# Patient Record
Sex: FEMALE | Race: WHITE | NOT HISPANIC OR LATINO | Employment: OTHER | URBAN - METROPOLITAN AREA
[De-identification: names, ages, dates, MRNs, and addresses within clinical notes are randomized per-mention and may not be internally consistent; named-entity substitution may affect disease eponyms.]

---

## 2018-08-29 LAB
EXTERNAL HIV SCREEN: NORMAL
HBA1C MFR BLD HPLC: 5.5 %
HCV AB SER-ACNC: <0.1

## 2021-02-15 ENCOUNTER — OFFICE VISIT (OUTPATIENT)
Dept: INTERNAL MEDICINE CLINIC | Facility: CLINIC | Age: 66
End: 2021-02-15
Payer: COMMERCIAL

## 2021-02-15 VITALS
HEART RATE: 70 BPM | SYSTOLIC BLOOD PRESSURE: 104 MMHG | TEMPERATURE: 97.9 F | HEIGHT: 63 IN | WEIGHT: 184 LBS | DIASTOLIC BLOOD PRESSURE: 78 MMHG | BODY MASS INDEX: 32.6 KG/M2

## 2021-02-15 DIAGNOSIS — R05.9 COUGH: ICD-10-CM

## 2021-02-15 DIAGNOSIS — I10 ESSENTIAL HYPERTENSION: Primary | ICD-10-CM

## 2021-02-15 DIAGNOSIS — K21.9 GASTROESOPHAGEAL REFLUX DISEASE WITHOUT ESOPHAGITIS: ICD-10-CM

## 2021-02-15 DIAGNOSIS — Z98.84 S/P GASTRIC BYPASS: ICD-10-CM

## 2021-02-15 DIAGNOSIS — D50.8 IRON DEFICIENCY ANEMIA SECONDARY TO INADEQUATE DIETARY IRON INTAKE: ICD-10-CM

## 2021-02-15 DIAGNOSIS — I82.561 CHRONIC DEEP VEIN THROMBOSIS (DVT) OF CALF MUSCLE VEIN OF RIGHT LOWER EXTREMITY (HCC): ICD-10-CM

## 2021-02-15 DIAGNOSIS — I26.99 PE (PULMONARY THROMBOEMBOLISM) (HCC): ICD-10-CM

## 2021-02-15 DIAGNOSIS — R06.02 SOB (SHORTNESS OF BREATH): ICD-10-CM

## 2021-02-15 PROCEDURE — 99204 OFFICE O/P NEW MOD 45 MIN: CPT | Performed by: FAMILY MEDICINE

## 2021-02-15 PROCEDURE — 3725F SCREEN DEPRESSION PERFORMED: CPT | Performed by: FAMILY MEDICINE

## 2021-02-15 RX ORDER — QUETIAPINE FUMARATE 50 MG/1
50 TABLET, FILM COATED ORAL
COMMUNITY
End: 2021-10-25 | Stop reason: ALTCHOICE

## 2021-02-15 RX ORDER — FENTANYL 25 UG/H
1 PATCH TRANSDERMAL
COMMUNITY
End: 2021-10-25 | Stop reason: ALTCHOICE

## 2021-02-15 RX ORDER — RIVAROXABAN 20 MG/1
TABLET, FILM COATED ORAL
COMMUNITY
Start: 2021-01-29 | End: 2021-04-19 | Stop reason: SDUPTHER

## 2021-02-15 RX ORDER — OXYCODONE HCL 20 MG/1
20 TABLET, FILM COATED, EXTENDED RELEASE ORAL EVERY 12 HOURS SCHEDULED
COMMUNITY
End: 2021-05-28 | Stop reason: SDUPTHER

## 2021-02-15 RX ORDER — BUPROPION HYDROCHLORIDE 300 MG/1
TABLET ORAL
COMMUNITY
Start: 2021-01-30

## 2021-02-15 RX ORDER — FAMOTIDINE 20 MG/1
20 TABLET, FILM COATED ORAL
COMMUNITY
End: 2021-06-08 | Stop reason: SDUPTHER

## 2021-02-15 RX ORDER — CELECOXIB 50 MG/1
50 CAPSULE ORAL 2 TIMES DAILY
COMMUNITY
End: 2021-03-08

## 2021-02-15 RX ORDER — METOPROLOL SUCCINATE 25 MG/1
TABLET, EXTENDED RELEASE ORAL
COMMUNITY
Start: 2021-01-29 | End: 2021-04-19 | Stop reason: SDUPTHER

## 2021-02-15 RX ORDER — FLUOXETINE HYDROCHLORIDE 40 MG/1
CAPSULE ORAL
COMMUNITY
Start: 2021-01-27 | End: 2021-02-15

## 2021-02-15 RX ORDER — GABAPENTIN 600 MG/1
1200 TABLET ORAL 3 TIMES DAILY
COMMUNITY
End: 2021-05-17 | Stop reason: SDUPTHER

## 2021-02-15 RX ORDER — CYCLOBENZAPRINE HCL 10 MG
TABLET ORAL
COMMUNITY
Start: 2021-02-02 | End: 2021-06-08 | Stop reason: SDUPTHER

## 2021-02-15 RX ORDER — ZOLPIDEM TARTRATE 12.5 MG/1
TABLET, FILM COATED, EXTENDED RELEASE ORAL
COMMUNITY
Start: 2021-02-01 | End: 2021-06-28 | Stop reason: SDUPTHER

## 2021-02-15 RX ORDER — GABAPENTIN 600 MG/1
TABLET ORAL
COMMUNITY
Start: 2021-01-05 | End: 2021-02-15

## 2021-02-15 RX ORDER — FLUOXETINE HYDROCHLORIDE 40 MG/1
40 CAPSULE ORAL
COMMUNITY

## 2021-02-15 NOTE — PROGRESS NOTES
INITIAL OFFICE VISIT  [facility] - [facility] LISANDRA SCHULTZ    NAME: Tiana Simpson  AGE: 63 y o  SEX: female  : 1955     DATE: 2021     Assessment and Plan:     Problem List Items Addressed This Visit     None      Visit Diagnoses     Essential hypertension    -  Primary    Relevant Medications    metoprolol succinate (TOPROL-XL) 25 mg 24 hr tablet    Other Relevant Orders    Comprehensive metabolic panel    PE (pulmonary thromboembolism) (HCC)        Chronic deep vein thrombosis (DVT) of calf muscle vein of right lower extremity (HCC)        Gastroesophageal reflux disease without esophagitis        Relevant Medications    famotidine (PEPCID) 20 mg tablet    Iron deficiency anemia secondary to inadequate dietary iron intake        Relevant Orders    CBC and Platelet    Ferritin    S/P gastric bypass        Relevant Orders    Vitamin D 25 hydroxy    Vitamin B12    CBC and Platelet    SOB (shortness of breath)        Relevant Orders    XR chest pa & lateral    Cough        Relevant Orders    XR chest pa & lateral          Plan; CXR for cough and SOB  Pt afebrile and with duration of symptom low suspicion for PNA  Pt to continue otc supportive therapies for now  Will check for likely deficiencies and correct if indicated  BP at goal  Pt to continue all current therapies as prescribed  Return in about 3 weeks (around 3/8/2021) for Next scheduled follow up  Chief Complaint:     Chief Complaint   Patient presents with    Establish [facility]     bronchitis - wants to check       History of Present Illness:   82-year-old female presents to establish care  Reports history of  DVT and PE secondary to a clotting disorder, GERD, iron deficiency anemia, high blood pressure, status post bariatric surgery almost 20 years ago  She does take a daily anticoagulant  Today she reports that she has had episode of bronchitis for the past 2 months  Symptoms come and go    She has been using over-the-counter Mucinex and Tylenol:  Flu for her symptoms  Has a persistent cough  Some shortness of breath when taking stairs  Initially had chest tightness at the beginning of the course but that has since improved  Her cough is productive but not a large amount  Occasionally tinged with blood from coughing hard  Not a current everyday smoker  Hx of second hand smoke exposure   From father and from spouse  Up-to-date on a colonoscopy  Up-to-date on her Pap  Up-to-date on her mammogram   Denies hx of MI or cva  S/P gastric bypass  Has anemia  S/p iron infusion last year  The following portions of the patient's history were reviewed and updated as appropriate: allergies, current medications, past family history, past medical history, past social history, past surgical history and problem list      Review of Systems:     Review of Systems   Constitutional: Positive for fatigue  HENT: Negative for hearing loss  Eyes: Positive for visual disturbance (needs glasses)  Respiratory: Positive for cough and shortness of breath  Negative for chest tightness and wheezing  Cardiovascular: Negative for chest pain and palpitations  Gastrointestinal: Negative for blood in stool, constipation and diarrhea  Genitourinary: Negative for hematuria  Musculoskeletal: Positive for back pain  Negative for gait problem  Skin: Positive for rash  Neurological: Negative for dizziness, light-headedness and headaches  Past Medical History:     Past Medical History:   Diagnosis Date    Anxiety     Depression     Hypertension         Past Surgical History:   History reviewed  No pertinent surgical history       Social History:     Social History     Socioeconomic History    Marital status: /Civil Union     Spouse name: None    Number of children: None    Years of education: None    Highest education level: None   Occupational History    None   Social Needs    Financial resource strain: None    Food insecurity     Worry: None     Inability: None    Transportation needs     Medical: None     Non-medical: None   Tobacco Use    Smoking status: Never Smoker    Smokeless tobacco: Never Used   Substance and Sexual Activity    Alcohol use: None    Drug use: None    Sexual activity: None   Lifestyle    Physical activity     Days per week: None     Minutes per session: None    Stress: None   Relationships    Social connections     Talks on phone: None     Gets together: None     Attends Holiness service: None     Active member of club or organization: None     Attends meetings of clubs or organizations: None     Relationship status: None    Intimate partner violence     Fear of current or ex partner: None     Emotionally abused: None     Physically abused: None     Forced sexual activity: None   Other Topics Concern    None   Social History Narrative    None         Family History:   History reviewed  No pertinent family history       Current Medications:     Current Outpatient Medications:     buPROPion (WELLBUTRIN XL) 300 mg 24 hr tablet, , Disp: , Rfl:     Calcium Carbonate-Vitamin D 600-400 MG-UNIT per tablet, Take 2 tablets by mouth, Disp: , Rfl:     celecoxib (CeleBREX) 50 MG capsule, Take 50 mg by mouth 2 (two) times a day, Disp: , Rfl:     cyclobenzaprine (FLEXERIL) 10 mg tablet, , Disp: , Rfl:     famotidine (PEPCID) 20 mg tablet, Take 20 mg by mouth, Disp: , Rfl:     fentaNYL (DURAGESIC) 25 mcg/hr, Place 1 patch on the skin every third day, Disp: , Rfl:     FLUoxetine (PROzac) 40 MG capsule, Take 40 mg by mouth, Disp: , Rfl:     gabapentin (NEURONTIN) 600 MG tablet, Take 1,200 mg by mouth 3 (three) times a day, Disp: , Rfl:     metoprolol succinate (TOPROL-XL) 25 mg 24 hr tablet, , Disp: , Rfl:     MULTIPLE VITAMIN PO, Take by mouth, Disp: , Rfl:     oxyCODONE (OxyCONTIN) 20 mg 12 hr tablet, Take 20 mg by mouth every 12 (twelve) hours, Disp: , Rfl:     QUEtiapine (SEROquel) 50 mg tablet, Take 50 mg by mouth, Disp: , Rfl:     Xarelto 20 MG tablet, , Disp: , Rfl:     zolpidem (AMBIEN CR) 12 5 MG CR tablet, , Disp: , Rfl:      Allergies: Allergies   Allergen Reactions    Molds & Smuts Hives     Difficulty breathing    Other Hives     Cherry, walnuts, almonds, all nuts, fresh tree fruits, pet dander    Peach Flavor Hives     Fresh peaches only    Pollen Extract Hives        Physical Exam:     /78 (BP Location: Left arm, Patient Position: Sitting) Comment: ds  Pulse 70   Temp 97 9 °F (36 6 °C) (Temporal)   Ht 5' 3" (1 6 m)   Wt 83 5 kg (184 lb)   BMI 32 59 kg/m²     Physical Exam  Constitutional:       Appearance: She is not ill-appearing  HENT:      Head: Normocephalic and atraumatic  Right Ear: External ear normal       Left Ear: External ear normal    Eyes:      Conjunctiva/sclera: Conjunctivae normal       Pupils: Pupils are equal, round, and reactive to light  Cardiovascular:      Rate and Rhythm: Normal rate and regular rhythm  Heart sounds: No murmur  Pulmonary:      Effort: Pulmonary effort is normal       Breath sounds: Normal breath sounds  No wheezing, rhonchi or rales  Abdominal:      General: Bowel sounds are normal       Palpations: Abdomen is soft  Tenderness: There is no abdominal tenderness  Musculoskeletal:         General: No tenderness  Right lower leg: No edema  Left lower leg: No edema  Neurological:      Mental Status: She is alert  Gait: Gait normal       Deep Tendon Reflexes: Reflexes normal    Psychiatric:         Mood and Affect: Mood normal          Behavior: Behavior normal            Data:     Laboratory Results: I have personally reviewed the pertinent laboratory results/reports   Radiology/Other Diagnostic Testing Results: I have personally reviewed pertinent reports        Jerald Ro DO  [facility]

## 2021-02-17 ENCOUNTER — LAB (OUTPATIENT)
Dept: LAB | Facility: HOSPITAL | Age: 66
End: 2021-02-17
Payer: COMMERCIAL

## 2021-02-17 ENCOUNTER — HOSPITAL ENCOUNTER (OUTPATIENT)
Dept: RADIOLOGY | Facility: HOSPITAL | Age: 66
Discharge: HOME | End: 2021-02-17
Payer: COMMERCIAL

## 2021-02-17 DIAGNOSIS — D50.8 IRON DEFICIENCY ANEMIA SECONDARY TO INADEQUATE DIETARY IRON INTAKE: ICD-10-CM

## 2021-02-17 DIAGNOSIS — R05.9 COUGH: ICD-10-CM

## 2021-02-17 DIAGNOSIS — I10 ESSENTIAL HYPERTENSION: ICD-10-CM

## 2021-02-17 DIAGNOSIS — R06.02 SOB (SHORTNESS OF BREATH): ICD-10-CM

## 2021-02-17 DIAGNOSIS — Z98.84 S/P GASTRIC BYPASS: ICD-10-CM

## 2021-02-17 LAB
25(OH)D3 SERPL-MCNC: 24.2 NG/ML (ref 30–100)
ALBUMIN SERPL BCP-MCNC: 3.3 G/DL (ref 3.5–5)
ALP SERPL-CCNC: 84 U/L (ref 46–116)
ALT SERPL W P-5'-P-CCNC: 20 U/L (ref 12–78)
ANION GAP SERPL CALCULATED.3IONS-SCNC: 7 MMOL/L (ref 4–13)
AST SERPL W P-5'-P-CCNC: 25 U/L (ref 5–45)
BILIRUB SERPL-MCNC: 0.6 MG/DL (ref 0.2–1)
BUN SERPL-MCNC: 12 MG/DL (ref 5–25)
CALCIUM ALBUM COR SERPL-MCNC: 9.5 MG/DL (ref 8.3–10.1)
CALCIUM SERPL-MCNC: 8.9 MG/DL (ref 8.3–10.1)
CHLORIDE SERPL-SCNC: 106 MMOL/L (ref 100–108)
CO2 SERPL-SCNC: 28 MMOL/L (ref 21–32)
CREAT SERPL-MCNC: 0.86 MG/DL (ref 0.6–1.3)
ERYTHROCYTE [DISTWIDTH] IN BLOOD BY AUTOMATED COUNT: 13.7 % (ref 11.6–15.1)
FERRITIN SERPL-MCNC: 122 NG/ML (ref 8–388)
GFR SERPL CREATININE-BSD FRML MDRD: 72 ML/MIN/1.73SQ M
GLUCOSE P FAST SERPL-MCNC: 87 MG/DL (ref 65–99)
HCT VFR BLD AUTO: 39.3 % (ref 34.8–46.1)
HGB BLD-MCNC: 12.7 G/DL (ref 11.5–15.4)
MCH RBC QN AUTO: 30.3 PG (ref 26.8–34.3)
MCHC RBC AUTO-ENTMCNC: 32.3 G/DL (ref 31.4–37.4)
MCV RBC AUTO: 94 FL (ref 82–98)
PLATELET # BLD AUTO: 313 THOUSANDS/UL (ref 149–390)
PMV BLD AUTO: 8.9 FL (ref 8.9–12.7)
POTASSIUM SERPL-SCNC: 3.9 MMOL/L (ref 3.5–5.3)
PROT SERPL-MCNC: 7.2 G/DL (ref 6.4–8.2)
RBC # BLD AUTO: 4.19 MILLION/UL (ref 3.81–5.12)
SODIUM SERPL-SCNC: 141 MMOL/L (ref 136–145)
VIT B12 SERPL-MCNC: 418 PG/ML (ref 100–900)
WBC # BLD AUTO: 4.28 THOUSAND/UL (ref 4.31–10.16)

## 2021-02-17 PROCEDURE — 36415 COLL VENOUS BLD VENIPUNCTURE: CPT

## 2021-02-17 PROCEDURE — 82306 VITAMIN D 25 HYDROXY: CPT

## 2021-02-17 PROCEDURE — 80053 COMPREHEN METABOLIC PANEL: CPT

## 2021-02-17 PROCEDURE — 71046 X-RAY EXAM CHEST 2 VIEWS: CPT

## 2021-02-17 PROCEDURE — 85027 COMPLETE CBC AUTOMATED: CPT

## 2021-02-17 PROCEDURE — 82607 VITAMIN B-12: CPT

## 2021-02-17 PROCEDURE — 82728 ASSAY OF FERRITIN: CPT

## 2021-02-19 ENCOUNTER — TELEPHONE (OUTPATIENT)
Dept: INTERNAL MEDICINE CLINIC | Facility: CLINIC | Age: 66
End: 2021-02-19

## 2021-02-19 NOTE — TELEPHONE ENCOUNTER
----- Message from Landy Rajput DO sent at 2/19/2021 12:59 PM EST -----  Regarding: labs  Overall labs were ok and there was nothing warranting  immediate follow up labs    Iron and vitamin B12 levels were normal  Her Vit D level were mildly lower than the normal range  She is to take a minimum of 5000 units of vitamin D a day       If she can't find this OTC I can prescribe them     Thanks,     Dr Lupe Omer

## 2021-02-23 DIAGNOSIS — J98.4 PULMONARY SCARRING: ICD-10-CM

## 2021-02-23 DIAGNOSIS — R06.02 SOB (SHORTNESS OF BREATH): Primary | ICD-10-CM

## 2021-03-08 ENCOUNTER — OFFICE VISIT (OUTPATIENT)
Dept: INTERNAL MEDICINE CLINIC | Facility: CLINIC | Age: 66
End: 2021-03-08
Payer: COMMERCIAL

## 2021-03-08 VITALS
HEART RATE: 68 BPM | DIASTOLIC BLOOD PRESSURE: 78 MMHG | WEIGHT: 187.2 LBS | SYSTOLIC BLOOD PRESSURE: 102 MMHG | HEIGHT: 63 IN | TEMPERATURE: 97.9 F | BODY MASS INDEX: 33.17 KG/M2

## 2021-03-08 DIAGNOSIS — M54.16 LUMBAR NERVE ROOT ENTRAPMENT: ICD-10-CM

## 2021-03-08 DIAGNOSIS — I10 ESSENTIAL HYPERTENSION: ICD-10-CM

## 2021-03-08 DIAGNOSIS — Z79.01 CHRONIC ANTICOAGULATION: ICD-10-CM

## 2021-03-08 DIAGNOSIS — E55.9 VITAMIN D INSUFFICIENCY: Primary | ICD-10-CM

## 2021-03-08 DIAGNOSIS — R06.02 SOB (SHORTNESS OF BREATH): ICD-10-CM

## 2021-03-08 PROBLEM — Z98.84 S/P GASTRIC BYPASS: Status: ACTIVE | Noted: 2021-03-08

## 2021-03-08 PROBLEM — K21.9 GASTROESOPHAGEAL REFLUX DISEASE WITHOUT ESOPHAGITIS: Status: ACTIVE | Noted: 2021-03-08

## 2021-03-08 PROCEDURE — 99214 OFFICE O/P EST MOD 30 MIN: CPT | Performed by: FAMILY MEDICINE

## 2021-03-08 RX ORDER — ARIPIPRAZOLE 5 MG/1
TABLET ORAL
COMMUNITY
Start: 2021-02-22
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
## 2021-03-08 NOTE — PATIENT INSTRUCTIONS
Continue your breathing exercises   I will call you with the results of your breathing function testing

## 2021-03-10 ENCOUNTER — HOSPITAL ENCOUNTER (OUTPATIENT)
Dept: PULMONOLOGY | Facility: HOSPITAL | Age: 66
Discharge: HOME | End: 2021-03-10
Payer: COMMERCIAL

## 2021-03-10 ENCOUNTER — HOSPITAL ENCOUNTER (EMERGENCY)
Facility: HOSPITAL | Age: 66
Discharge: HOME | End: 2021-03-10
Attending: EMERGENCY MEDICINE | Admitting: EMERGENCY MEDICINE
Payer: COMMERCIAL

## 2021-03-10 ENCOUNTER — APPOINTMENT (EMERGENCY)
Dept: RADIOLOGY | Facility: HOSPITAL | Age: 66
End: 2021-03-10
Payer: COMMERCIAL

## 2021-03-10 VITALS
DIASTOLIC BLOOD PRESSURE: 58 MMHG | TEMPERATURE: 98.3 F | OXYGEN SATURATION: 93 % | HEART RATE: 70 BPM | RESPIRATION RATE: 18 BRPM | SYSTOLIC BLOOD PRESSURE: 116 MMHG

## 2021-03-10 DIAGNOSIS — J98.4 PULMONARY SCARRING: ICD-10-CM

## 2021-03-10 DIAGNOSIS — S52.502A CLOSED FRACTURE OF DISTAL ENDS OF LEFT RADIUS AND ULNA, INITIAL ENCOUNTER: Primary | ICD-10-CM

## 2021-03-10 DIAGNOSIS — S52.602A CLOSED FRACTURE OF DISTAL ENDS OF LEFT RADIUS AND ULNA, INITIAL ENCOUNTER: Primary | ICD-10-CM

## 2021-03-10 DIAGNOSIS — R06.02 SOB (SHORTNESS OF BREATH): ICD-10-CM

## 2021-03-10 PROCEDURE — 29125 APPL SHORT ARM SPLINT STATIC: CPT | Performed by: PHYSICIAN ASSISTANT

## 2021-03-10 PROCEDURE — 94760 N-INVAS EAR/PLS OXIMETRY 1: CPT

## 2021-03-10 PROCEDURE — 94729 DIFFUSING CAPACITY: CPT

## 2021-03-10 PROCEDURE — 94727 GAS DIL/WSHOT DETER LNG VOL: CPT | Performed by: INTERNAL MEDICINE

## 2021-03-10 PROCEDURE — 94010 BREATHING CAPACITY TEST: CPT

## 2021-03-10 PROCEDURE — 99283 EMERGENCY DEPT VISIT LOW MDM: CPT

## 2021-03-10 PROCEDURE — 94729 DIFFUSING CAPACITY: CPT | Performed by: INTERNAL MEDICINE

## 2021-03-10 PROCEDURE — 99284 EMERGENCY DEPT VISIT MOD MDM: CPT | Performed by: PHYSICIAN ASSISTANT

## 2021-03-10 PROCEDURE — 94727 GAS DIL/WSHOT DETER LNG VOL: CPT

## 2021-03-10 PROCEDURE — 94010 BREATHING CAPACITY TEST: CPT | Performed by: INTERNAL MEDICINE

## 2021-03-10 PROCEDURE — 73110 X-RAY EXAM OF WRIST: CPT

## 2021-03-10 NOTE — ED PROVIDER NOTES
History  Chief Complaint   Patient presents with    Wrist Injury     Patient reports she fell on her bathroom this morning and injured her L wrist  Patient reports negative head strike, negative LOC, postive BT  Patient is a 47-year-old female presents emergency department complaints of left wrist pain  Patient states she got up to use the restroom in the middle night and tripped on the carpet  Patient states she fell on her left wrist   She denies any head injury, neck pain, back pain, chest pain, shortness of breath  Prior to Admission Medications   Prescriptions Last Dose Informant Patient Reported? Taking? ARIPiprazole (ABILIFY) 5 mg tablet   Yes No   Calcium Carbonate-Vitamin D 600-400 MG-UNIT per tablet   Yes No   Sig: Take 2 tablets by mouth   FLUoxetine (PROzac) 40 MG capsule   Yes No   Sig: Take 40 mg by mouth   MULTIPLE VITAMIN PO   Yes No   Sig: Take by mouth   QUEtiapine (SEROquel) 50 mg tablet   Yes No   Sig: Take 50 mg by mouth   Xarelto 20 MG tablet   Yes No   buPROPion (WELLBUTRIN XL) 300 mg 24 hr tablet   Yes No   cyclobenzaprine (FLEXERIL) 10 mg tablet   Yes No   famotidine (PEPCID) 20 mg tablet   Yes No   Sig: Take 20 mg by mouth   fentaNYL (DURAGESIC) 25 mcg/hr   Yes No   Sig: Place 1 patch on the skin every third day   gabapentin (NEURONTIN) 600 MG tablet   Yes No   Sig: Take 1,200 mg by mouth 3 (three) times a day   metoprolol succinate (TOPROL-XL) 25 mg 24 hr tablet   Yes No   oxyCODONE (OxyCONTIN) 20 mg 12 hr tablet   Yes No   Sig: Take 20 mg by mouth every 12 (twelve) hours   zolpidem (AMBIEN CR) 12 5 MG CR tablet   Yes No      Facility-Administered Medications: None       Past Medical History:   Diagnosis Date    Anxiety     Depression     Hypertension        History reviewed  No pertinent surgical history  History reviewed  No pertinent family history  I have reviewed and agree with the history as documented      E-Cigarette/Vaping    E-Cigarette Use Never User E-Cigarette/Vaping Substances    Nicotine No     THC No     CBD No     Flavoring No     Other No     Unknown No      Social History     Tobacco Use    Smoking status: Never Smoker    Smokeless tobacco: Never Used   Substance Use Topics    Alcohol use: Not on file    Drug use: Not on file       Review of Systems   Constitutional: Negative for fever  Respiratory: Negative for shortness of breath  Cardiovascular: Negative for chest pain  Musculoskeletal: Positive for arthralgias  All other systems reviewed and are negative  Physical Exam  Physical Exam  Vitals signs reviewed  Constitutional:       Appearance: Normal appearance  HENT:      Head: Normocephalic and atraumatic  Cardiovascular:      Rate and Rhythm: Normal rate and regular rhythm  Pulses: Normal pulses  Heart sounds: Normal heart sounds  Musculoskeletal:      Left wrist: She exhibits decreased range of motion, tenderness and swelling  Skin:     General: Skin is warm  Capillary Refill: Capillary refill takes less than 2 seconds  Neurological:      General: No focal deficit present  Mental Status: She is alert and oriented to person, place, and time  Psychiatric:         Mood and Affect: Mood normal          Behavior: Behavior normal          Thought Content:  Thought content normal          Judgment: Judgment normal          Vital Signs  ED Triage Vitals [03/10/21 0529]   Temperature Pulse Respirations Blood Pressure SpO2   98 3 °F (36 8 °C) 76 18 128/73 99 %      Temp Source Heart Rate Source Patient Position - Orthostatic VS BP Location FiO2 (%)   Oral Monitor Lying Right arm --      Pain Score       --           Vitals:    03/10/21 0529 03/10/21 0600   BP: 128/73 116/58   Pulse: 76 70   Patient Position - Orthostatic VS: Lying Lying         Visual Acuity  Visual Acuity      Most Recent Value   L Pupil Size (mm)  4   R Pupil Size (mm)  4          ED Medications  Medications - No data to display    Diagnostic Studies  Results Reviewed     None                 XR wrist 3+ views LEFT   ED Interpretation by Benjie Stearns PA-C (03/10 9026)   Distal radius, ulnar styloid fracture                   Procedures  Splint application    Date/Time: 3/10/2021 7:02 AM  Performed by: Benjie Stearns PA-C  Authorized by: Benjie Stearns PA-C   Universal Protocol:  Risks and benefits: risks, benefits and alternatives were discussed  Consent given by: patient  Patient understanding: patient states understanding of the procedure being performed  Patient identity confirmed: verbally with patient      Pre-procedure details:     Sensation:  Normal  Procedure details:     Laterality:  Left    Location:  Wrist    Wrist:  L wrist    Splint type:  Sugar tong    Supplies:  Ortho-Glass and cotton padding             ED Course                             SBIRT 20yo+      Most Recent Value   SBIRT (24 yo +)   In order to provide better care to our patients, we are screening all of our patients for alcohol and drug use  Would it be okay to ask you these screening questions? Yes Filed at: 03/10/2021 0531   Initial Alcohol Screen: US AUDIT-C    1  How often do you have a drink containing alcohol?  0 Filed at: 03/10/2021 0531   2  How many drinks containing alcohol do you have on a typical day you are drinking? 0 Filed at: 03/10/2021 0531   3a  Male UNDER 65: How often do you have five or more drinks on one occasion? 0 Filed at: 03/10/2021 0531   3b  FEMALE Any Age, or MALE 65+: How often do you have 4 or more drinks on one occassion? 0 Filed at: 03/10/2021 0531   Audit-C Score  0 Filed at: 03/10/2021 0864   LARS: How many times in the past year have you    Used an illegal drug or used a prescription medication for non-medical reasons?   Never Filed at: 03/10/2021 0531                    MDM  Number of Diagnoses or Management Options  Closed fracture of distal ends of left radius and ulna, initial encounter: Diagnosis management comments: Patient is a 29-year-old female presents emergency department complaints of left wrist pain  Patient states she got up to use the restroom in the middle night and tripped on the carpet  Patient states she fell on her left wrist   She denies any head injury, neck pain, back pain, chest pain, shortness of breath  Examination of left upper extremity, she is neurovascular intact  She has 2+ distal raise pulse  There is test palpation of the distal radius with soft tissue swelling noted  She is nontender over the elbow  There is full range of motion of the elbow  Range of motion of his decreased secondary to pain  X-ray images left wrist reviewed and does show a nondisplaced distal radius fracture and ulnar styloid fracture  Patient is placed in sugar-tong splint  She is to follow up with Orthopedics  Return parameters discussed  Patient stable for discharge  Amount and/or Complexity of Data Reviewed  Tests in the radiology section of CPT®: ordered and reviewed  Independent visualization of images, tracings, or specimens: yes    Risk of Complications, Morbidity, and/or Mortality  Presenting problems: moderate  Diagnostic procedures: moderate  Management options: moderate    Patient Progress  Patient progress: stable      Disposition  Final diagnoses:   Closed fracture of distal ends of left radius and ulna, initial encounter     Time reflects when diagnosis was documented in both MDM as applicable and the Disposition within this note     Time User Action Codes Description Comment    3/10/2021  6:16 AM Kim Brennan Add [G33 533A,  O85 020G] Closed fracture of distal ends of left radius and ulna, initial encounter       ED Disposition     ED Disposition Condition Date/Time Comment    Discharge Good Wed Mar 10, 2021 2 Rehab Loc discharge to home/self care              Follow-up Information     Follow up With Specialties Details Why Contact Info Additional [address] [facility] Orthopedic Surgery Schedule an appointment as soon as possible for a visit   [address] [facility]  [address] Enrique [phone]  [address] [facility], [address], [place], [place], [phone]          Discharge Medication List as of 3/10/2021  6:16 AM      CONTINUE these medications which have NOT CHANGED    Details   ARIPiprazole (ABILIFY) 5 mg tablet Starting Mon 2/22/2021, Historical Med      buPROPion (WELLBUTRIN XL) 300 mg 24 hr tablet Starting Sat 1/30/2021, Historical Med      Calcium Carbonate-Vitamin D 600-400 MG-UNIT per tablet Take 2 tablets by mouth, Historical Med      cyclobenzaprine (FLEXERIL) 10 mg tablet Starting Tue 2/2/2021, Historical Med      famotidine (PEPCID) 20 mg tablet Take 20 mg by mouth, Historical Med      fentaNYL (DURAGESIC) 25 mcg/hr Place 1 patch on the skin every third day, Historical Med      FLUoxetine (PROzac) 40 MG capsule Take 40 mg by mouth, Historical Med      gabapentin (NEURONTIN) 600 MG tablet Take 1,200 mg by mouth 3 (three) times a day, Historical Med      metoprolol succinate (TOPROL-XL) 25 mg 24 hr tablet Starting Fri 1/29/2021, Historical Med      MULTIPLE VITAMIN PO Take by mouth, Historical Med      oxyCODONE (OxyCONTIN) 20 mg 12 hr tablet Take 20 mg by mouth every 12 (twelve) hours, Historical Med      QUEtiapine (SEROquel) 50 mg tablet Take 50 mg by mouth, Historical Med      Xarelto 20 MG tablet Starting Fri 1/29/2021, Historical Med      zolpidem (AMBIEN CR) 12 5 MG CR tablet Starting Mon 2/1/2021, Historical Med           No discharge procedures on file      PDMP Review     None          ED Provider  Electronically Signed by           Tanvir Bills PA-C  03/10/21 2461

## 2021-03-11 ENCOUNTER — OFFICE VISIT (OUTPATIENT)
Dept: OBGYN CLINIC | Facility: CLINIC | Age: 66
End: 2021-03-11
Payer: COMMERCIAL

## 2021-03-11 VITALS
HEIGHT: 63 IN | SYSTOLIC BLOOD PRESSURE: 136 MMHG | HEART RATE: 82 BPM | WEIGHT: 187.2 LBS | BODY MASS INDEX: 33.17 KG/M2 | DIASTOLIC BLOOD PRESSURE: 90 MMHG

## 2021-03-11 DIAGNOSIS — S52.552A OTHER CLOSED EXTRA-ARTICULAR FRACTURE OF DISTAL END OF LEFT RADIUS, INITIAL ENCOUNTER: Primary | ICD-10-CM

## 2021-03-11 PROCEDURE — 99204 OFFICE O/P NEW MOD 45 MIN: CPT | Performed by: ORTHOPAEDIC SURGERY

## 2021-03-11 RX ORDER — ACETAMINOPHEN 500 MG
TABLET ORAL
Qty: 30 TABLET | Refills: 0 | Status: SHIPPED | OUTPATIENT
Start: 2021-03-11 | End: 2021-05-17

## 2021-03-11 NOTE — H&P (VIEW-ONLY)
CHIEF COMPLAINT:  Chief Complaint   Patient presents with    Left Wrist - Pain       SUBJECTIVE:  Narciso Anderson is a 63y o  year old female who presents  Left wrist pain  Patient reports that she fell in a bathroom on 03/10/2021 landing on the outstretched hand  She denies any her head or any loss of consciousness  She went to the emergency room and had x-rays which demonstrated a distal radius fracture  She was placed into splint instructed to follow up with Orthopedics  Today she presents with left wrist pain  She has pain with any movement of her wrist   She denies any numbness or tingling  The patient denies any cardiac or pulmonary issues  Denies diabetes  Denies any history of MI, gastric ulcers, kidney or liver issues  Patient is on Xarelto for blood clots  PAST MEDICAL HISTORY:  Past Medical History:   Diagnosis Date    Anxiety     Depression     Hypertension        PAST SURGICAL HISTORY:  History reviewed  No pertinent surgical history  FAMILY HISTORY:  History reviewed  No pertinent family history      SOCIAL HISTORY:  Social History     Tobacco Use    Smoking status: Never Smoker    Smokeless tobacco: Never Used   Substance Use Topics    Alcohol use: Never     Frequency: Never    Drug use: Never       MEDICATIONS:    Current Outpatient Medications:     ARIPiprazole (ABILIFY) 5 mg tablet, , Disp: , Rfl:     buPROPion (WELLBUTRIN XL) 300 mg 24 hr tablet, , Disp: , Rfl:     Calcium Carbonate-Vitamin D 600-400 MG-UNIT per tablet, Take 2 tablets by mouth, Disp: , Rfl:     cyclobenzaprine (FLEXERIL) 10 mg tablet, , Disp: , Rfl:     famotidine (PEPCID) 20 mg tablet, Take 20 mg by mouth, Disp: , Rfl:     fentaNYL (DURAGESIC) 25 mcg/hr, Place 1 patch on the skin every third day, Disp: , Rfl:     FLUoxetine (PROzac) 40 MG capsule, Take 40 mg by mouth, Disp: , Rfl:     gabapentin (NEURONTIN) 600 MG tablet, Take 1,200 mg by mouth 3 (three) times a day, Disp: , Rfl:    metoprolol succinate (TOPROL-XL) 25 mg 24 hr tablet, , Disp: , Rfl:     MULTIPLE VITAMIN PO, Take by mouth, Disp: , Rfl:     oxyCODONE (OxyCONTIN) 20 mg 12 hr tablet, Take 20 mg by mouth every 12 (twelve) hours, Disp: , Rfl:     QUEtiapine (SEROquel) 50 mg tablet, Take 50 mg by mouth, Disp: , Rfl:     Xarelto 20 MG tablet, , Disp: , Rfl:     zolpidem (AMBIEN CR) 12 5 MG CR tablet, , Disp: , Rfl:     acetaminophen (TYLENOL) 500 mg tablet, Take 1 500mg tablet in the morning prior to surgery, then 1 tablet every 6 hours for 5-7 days  , Disp: 30 tablet, Rfl: 0    ALLERGIES:  Allergies   Allergen Reactions    Molds & Smuts Hives     Difficulty breathing    Other Hives     Cherry, walnuts, almonds, all nuts, fresh tree fruits, pet dander    Peach Flavor Hives     Fresh peaches only    Pollen Extract Hives       REVIEW OF SYSTEMS:  Review of Systems    ROS:   General: no fever, no chills  HEENT:  No loss of hearing or eyesight problems  Eyes:  No red eyes  Respiratory:  No coughing, shortness of breath or wheezing  Cardiovascular:  No chest pain, no palpitations  GI:  Abdomen soft nontender, denies nausea  Endocrine:  No muscle weakness, no frequent urination, no excessive thirst  Urinary:  No dysuria, no incontinence  Musculoskeletal: see HPI and PE  SKIN:  No skin rash, no dry skin  Neurological:  No headaches, no confusion  Psychiatric:  No suicide thoughts, no anxiety, no depression  Review of all other systems is negative    VITALS:  Vitals:    03/11/21 0804   BP: 136/90   Pulse: 82       LABS:  HgA1c: No results found for: HGBA1C  BMP:   Lab Results   Component Value Date    CALCIUM 8 9 02/17/2021    K 3 9 02/17/2021    CO2 28 02/17/2021     02/17/2021    BUN 12 02/17/2021    CREATININE 0 86 02/17/2021       _____________________________________________________  PHYSICAL EXAMINATION:  General: well developed and well nourished, alert, oriented times 3 and appears comfortable  Psychiatric: Normal  HEENT: Trachea Midline, No torticollis  Pulmonary: No audible wheezing or strider  Cardiovascular: No discernable arrhythmia   Skin: No masses, erythema, lacerations, fluctation, ulcerations  Neurovascular: Sensation Intact to the Median, Ulnar, Radial Nerve, Motor Intact to the Median, Ulnar, Radial Nerve and Pulses Intact    MUSCULOSKELETAL EXAMINATION:    Left wrist   swelling is noted over the dorsal aspect of wrist   She has some tenderness with resisted thumb extension she has tenderness over distal radius   Range of motion strength deferred due fracture   Patient is neurovascular intact  ___________________________________________________  STUDIES REVIEWED:  Images obtained on 03/10/2021 of the Left wrist 3 views demonstrate Mild dorsal angulation left distal radius fracture      PROCEDURES PERFORMED:  Procedures  No Procedures performed today    _____________________________________________________  ASSESSMENT/PLAN:     left distal radius fracture, with EPL tendon entrapment  -  Conservative and operative treatment were discussed with the patient she would like to proceed with surgical intervention  Detail consent was obtained today   Surgery: left  Distal radius ORIF, EPL tendon release   Anesthesia:  Regional with sedation   Antibiotics:  ordered   Medical clearance: not needed   Hand therapy: ordered   Consent: obtained    patient currently takes pain medication at home    Surgery medication instructions: You will stop eating and drinking at midnight the night before your surgery, but you may continue to take your normal medications with a small sip of water  In the morning on the day of your surgery, we would like you to take the following medication:   Tylenol 500mg one tablet by mouth    After surgery, we would like you to take Tylenol 500 mg one tablet by mouth every 6 hours  (at breakfast, lunch and dinner) for 5-7 days after your surgery    Please take this medication EVERYDAY after surgery for 5-7 days, and not just as needed  Taking this medications after surgery will limit your need for prescription pain medication  We will also prescribe a narcotic pain medication for a limited time after surgery that you can take as needed for moderate or severe pain  The narcotic pain medication may also have Tylenol in it  Please limit Tylenol usage to under 3,000mg a day  Diagnoses and all orders for this visit:    Other closed extra-articular fracture of distal end of left radius, initial encounter  -     acetaminophen (TYLENOL) 500 mg tablet; Take 1 500mg tablet in the morning prior to surgery, then 1 tablet every 6 hours for 5-7 days  -     Ambulatory referral to PT/OT hand therapy; Future  -     Case request operating room: OPEN REDUCTION W/ INTERNAL FIXATION (ORIF) RADIUS Left distal radius, EPL tendon release; Standing    Other orders  -     Diet NPO; Sips with meds; Standing  -     Height and weight upon arrival; Standing  -     Nursing Communication [address] [place] [address] Interventions Implemented; Standing  -     Nursing Communication [facility] bath, have staff wash entire body (neck down) per pre-op bathing protocol  Routine, evening prior to, and day of surgery ; Standing  -     Void on call to OR; Standing  -     Insert peripheral IV; Standing  -     ceFAZolin (ANCEF) 2,000 mg in dextrose 5 % 100 mL IVPB        Follow Up:  Return for post op  Work/school status:   no restrictions    To Do Next Visit:  Re-evaluation of current issue, x-rays of left wrist, sutures out  Scribe Attestation    I,:  Roberth Samson PA-C am acting as a scribe while in the presence of the attending physician :       I,:  Zach Salcido MD personally performed the services described in this documentation    as scribed in my presence :           Portions of the record may have been created with voice recognition software    Occasional wrong word or "sound a like" substitutions may have occurred due to the inherent limitations of voice recognition software  Read the chart carefully and recognize, using context, where substitutions have occurred

## 2021-03-11 NOTE — PROGRESS NOTES
CHIEF COMPLAINT:  Chief Complaint   Patient presents with    Left Wrist - Pain       SUBJECTIVE:  Nichole Lagos is a 63y o  year old female who presents  Left wrist pain  Patient reports that she fell in a bathroom on 03/10/2021 landing on the outstretched hand  She denies any her head or any loss of consciousness  She went to the emergency room and had x-rays which demonstrated a distal radius fracture  She was placed into splint instructed to follow up with Orthopedics  Today she presents with left wrist pain  She has pain with any movement of her wrist   She denies any numbness or tingling  The patient denies any cardiac or pulmonary issues  Denies diabetes  Denies any history of MI, gastric ulcers, kidney or liver issues  Patient is on Xarelto for blood clots  PAST MEDICAL HISTORY:  Past Medical History:   Diagnosis Date    Anxiety     Depression     Hypertension        PAST SURGICAL HISTORY:  History reviewed  No pertinent surgical history  FAMILY HISTORY:  History reviewed  No pertinent family history      SOCIAL HISTORY:  Social History     Tobacco Use    Smoking status: Never Smoker    Smokeless tobacco: Never Used   Substance Use Topics    Alcohol use: Never     Frequency: Never    Drug use: Never       MEDICATIONS:    Current Outpatient Medications:     ARIPiprazole (ABILIFY) 5 mg tablet, , Disp: , Rfl:     buPROPion (WELLBUTRIN XL) 300 mg 24 hr tablet, , Disp: , Rfl:     Calcium Carbonate-Vitamin D 600-400 MG-UNIT per tablet, Take 2 tablets by mouth, Disp: , Rfl:     cyclobenzaprine (FLEXERIL) 10 mg tablet, , Disp: , Rfl:     famotidine (PEPCID) 20 mg tablet, Take 20 mg by mouth, Disp: , Rfl:     fentaNYL (DURAGESIC) 25 mcg/hr, Place 1 patch on the skin every third day, Disp: , Rfl:     FLUoxetine (PROzac) 40 MG capsule, Take 40 mg by mouth, Disp: , Rfl:     gabapentin (NEURONTIN) 600 MG tablet, Take 1,200 mg by mouth 3 (three) times a day, Disp: , Rfl:    metoprolol succinate (TOPROL-XL) 25 mg 24 hr tablet, , Disp: , Rfl:     MULTIPLE VITAMIN PO, Take by mouth, Disp: , Rfl:     oxyCODONE (OxyCONTIN) 20 mg 12 hr tablet, Take 20 mg by mouth every 12 (twelve) hours, Disp: , Rfl:     QUEtiapine (SEROquel) 50 mg tablet, Take 50 mg by mouth, Disp: , Rfl:     Xarelto 20 MG tablet, , Disp: , Rfl:     zolpidem (AMBIEN CR) 12 5 MG CR tablet, , Disp: , Rfl:     acetaminophen (TYLENOL) 500 mg tablet, Take 1 500mg tablet in the morning prior to surgery, then 1 tablet every 6 hours for 5-7 days  , Disp: 30 tablet, Rfl: 0    ALLERGIES:  Allergies   Allergen Reactions    Molds & Smuts Hives     Difficulty breathing    Other Hives     Cherry, walnuts, almonds, all nuts, fresh tree fruits, pet dander    Peach Flavor Hives     Fresh peaches only    Pollen Extract Hives       REVIEW OF SYSTEMS:  Review of Systems    ROS:   General: no fever, no chills  HEENT:  No loss of hearing or eyesight problems  Eyes:  No red eyes  Respiratory:  No coughing, shortness of breath or wheezing  Cardiovascular:  No chest pain, no palpitations  GI:  Abdomen soft nontender, denies nausea  Endocrine:  No muscle weakness, no frequent urination, no excessive thirst  Urinary:  No dysuria, no incontinence  Musculoskeletal: see HPI and PE  SKIN:  No skin rash, no dry skin  Neurological:  No headaches, no confusion  Psychiatric:  No suicide thoughts, no anxiety, no depression  Review of all other systems is negative    VITALS:  Vitals:    03/11/21 0804   BP: 136/90   Pulse: 82       LABS:  HgA1c: No results found for: HGBA1C  BMP:   Lab Results   Component Value Date    CALCIUM 8 9 02/17/2021    K 3 9 02/17/2021    CO2 28 02/17/2021     02/17/2021    BUN 12 02/17/2021    CREATININE 0 86 02/17/2021       _____________________________________________________  PHYSICAL EXAMINATION:  General: well developed and well nourished, alert, oriented times 3 and appears comfortable  Psychiatric: Normal  HEENT: Trachea Midline, No torticollis  Pulmonary: No audible wheezing or strider  Cardiovascular: No discernable arrhythmia   Skin: No masses, erythema, lacerations, fluctation, ulcerations  Neurovascular: Sensation Intact to the Median, Ulnar, Radial Nerve, Motor Intact to the Median, Ulnar, Radial Nerve and Pulses Intact    MUSCULOSKELETAL EXAMINATION:    Left wrist   swelling is noted over the dorsal aspect of wrist   She has some tenderness with resisted thumb extension she has tenderness over distal radius   Range of motion strength deferred due fracture   Patient is neurovascular intact  ___________________________________________________  STUDIES REVIEWED:  Images obtained on 03/10/2021 of the Left wrist 3 views demonstrate Mild dorsal angulation left distal radius fracture      PROCEDURES PERFORMED:  Procedures  No Procedures performed today    _____________________________________________________  ASSESSMENT/PLAN:     left distal radius fracture, with EPL tendon entrapment  -  Conservative and operative treatment were discussed with the patient she would like to proceed with surgical intervention  Detail consent was obtained today   Surgery: left  Distal radius ORIF, EPL tendon release   Anesthesia:  Regional with sedation   Antibiotics:  ordered   Medical clearance: not needed   Hand therapy: ordered   Consent: obtained    patient currently takes pain medication at home    Surgery medication instructions: You will stop eating and drinking at midnight the night before your surgery, but you may continue to take your normal medications with a small sip of water  In the morning on the day of your surgery, we would like you to take the following medication:   Tylenol 500mg one tablet by mouth    After surgery, we would like you to take Tylenol 500 mg one tablet by mouth every 6 hours  (at breakfast, lunch and dinner) for 5-7 days after your surgery    Please take this medication EVERYDAY after surgery for 5-7 days, and not just as needed  Taking this medications after surgery will limit your need for prescription pain medication  We will also prescribe a narcotic pain medication for a limited time after surgery that you can take as needed for moderate or severe pain  The narcotic pain medication may also have Tylenol in it  Please limit Tylenol usage to under 3,000mg a day  Diagnoses and all orders for this visit:    Other closed extra-articular fracture of distal end of left radius, initial encounter  -     acetaminophen (TYLENOL) 500 mg tablet; Take 1 500mg tablet in the morning prior to surgery, then 1 tablet every 6 hours for 5-7 days  -     Ambulatory referral to PT/OT hand therapy; Future  -     Case request operating room: OPEN REDUCTION W/ INTERNAL FIXATION (ORIF) RADIUS Left distal radius, EPL tendon release; Standing    Other orders  -     Diet NPO; Sips with meds; Standing  -     Height and weight upon arrival; Standing  -     Nursing Communication [address] [place] [address] Interventions Implemented; Standing  -     Nursing Communication [facility] bath, have staff wash entire body (neck down) per pre-op bathing protocol  Routine, evening prior to, and day of surgery ; Standing  -     Void on call to OR; Standing  -     Insert peripheral IV; Standing  -     ceFAZolin (ANCEF) 2,000 mg in dextrose 5 % 100 mL IVPB        Follow Up:  Return for post op  Work/school status:   no restrictions    To Do Next Visit:  Re-evaluation of current issue, x-rays of left wrist, sutures out  Scribe Attestation    I,:  Sherri Boyd PA-C am acting as a scribe while in the presence of the attending physician :       I,:  Darren Diaz MD personally performed the services described in this documentation    as scribed in my presence :           Portions of the record may have been created with voice recognition software    Occasional wrong word or "sound a like" substitutions may have occurred due to the inherent limitations of voice recognition software  Read the chart carefully and recognize, using context, where substitutions have occurred

## 2021-03-11 NOTE — H&P
CHIEF COMPLAINT:  Chief Complaint   Patient presents with    Left Wrist - Pain       SUBJECTIVE:  Bryant Shannon is a 63y o  year old female who presents  Left wrist pain  Patient reports that she fell in a bathroom on 03/10/2021 landing on the outstretched hand  She denies any her head or any loss of consciousness  She went to the emergency room and had x-rays which demonstrated a distal radius fracture  She was placed into splint instructed to follow up with Orthopedics  Today she presents with left wrist pain  She has pain with any movement of her wrist   She denies any numbness or tingling  The patient denies any cardiac or pulmonary issues  Denies diabetes  Denies any history of MI, gastric ulcers, kidney or liver issues  Patient is on Xarelto for blood clots  PAST MEDICAL HISTORY:  Past Medical History:   Diagnosis Date    Anxiety     Depression     Hypertension        PAST SURGICAL HISTORY:  History reviewed  No pertinent surgical history  FAMILY HISTORY:  History reviewed  No pertinent family history      SOCIAL HISTORY:  Social History     Tobacco Use    Smoking status: Never Smoker    Smokeless tobacco: Never Used   Substance Use Topics    Alcohol use: Never     Frequency: Never    Drug use: Never       MEDICATIONS:    Current Outpatient Medications:     ARIPiprazole (ABILIFY) 5 mg tablet, , Disp: , Rfl:     buPROPion (WELLBUTRIN XL) 300 mg 24 hr tablet, , Disp: , Rfl:     Calcium Carbonate-Vitamin D 600-400 MG-UNIT per tablet, Take 2 tablets by mouth, Disp: , Rfl:     cyclobenzaprine (FLEXERIL) 10 mg tablet, , Disp: , Rfl:     famotidine (PEPCID) 20 mg tablet, Take 20 mg by mouth, Disp: , Rfl:     fentaNYL (DURAGESIC) 25 mcg/hr, Place 1 patch on the skin every third day, Disp: , Rfl:     FLUoxetine (PROzac) 40 MG capsule, Take 40 mg by mouth, Disp: , Rfl:     gabapentin (NEURONTIN) 600 MG tablet, Take 1,200 mg by mouth 3 (three) times a day, Disp: , Rfl:    metoprolol succinate (TOPROL-XL) 25 mg 24 hr tablet, , Disp: , Rfl:     MULTIPLE VITAMIN PO, Take by mouth, Disp: , Rfl:     oxyCODONE (OxyCONTIN) 20 mg 12 hr tablet, Take 20 mg by mouth every 12 (twelve) hours, Disp: , Rfl:     QUEtiapine (SEROquel) 50 mg tablet, Take 50 mg by mouth, Disp: , Rfl:     Xarelto 20 MG tablet, , Disp: , Rfl:     zolpidem (AMBIEN CR) 12 5 MG CR tablet, , Disp: , Rfl:     acetaminophen (TYLENOL) 500 mg tablet, Take 1 500mg tablet in the morning prior to surgery, then 1 tablet every 6 hours for 5-7 days  , Disp: 30 tablet, Rfl: 0    ALLERGIES:  Allergies   Allergen Reactions    Molds & Smuts Hives     Difficulty breathing    Other Hives     Cherry, walnuts, almonds, all nuts, fresh tree fruits, pet dander    Peach Flavor Hives     Fresh peaches only    Pollen Extract Hives       REVIEW OF SYSTEMS:  Review of Systems    ROS:   General: no fever, no chills  HEENT:  No loss of hearing or eyesight problems  Eyes:  No red eyes  Respiratory:  No coughing, shortness of breath or wheezing  Cardiovascular:  No chest pain, no palpitations  GI:  Abdomen soft nontender, denies nausea  Endocrine:  No muscle weakness, no frequent urination, no excessive thirst  Urinary:  No dysuria, no incontinence  Musculoskeletal: see HPI and PE  SKIN:  No skin rash, no dry skin  Neurological:  No headaches, no confusion  Psychiatric:  No suicide thoughts, no anxiety, no depression  Review of all other systems is negative    VITALS:  Vitals:    03/11/21 0804   BP: 136/90   Pulse: 82       LABS:  HgA1c: No results found for: HGBA1C  BMP:   Lab Results   Component Value Date    CALCIUM 8 9 02/17/2021    K 3 9 02/17/2021    CO2 28 02/17/2021     02/17/2021    BUN 12 02/17/2021    CREATININE 0 86 02/17/2021       _____________________________________________________  PHYSICAL EXAMINATION:  General: well developed and well nourished, alert, oriented times 3 and appears comfortable  Psychiatric: Normal  HEENT: Trachea Midline, No torticollis  Pulmonary: No audible wheezing or strider  Cardiovascular: No discernable arrhythmia   Skin: No masses, erythema, lacerations, fluctation, ulcerations  Neurovascular: Sensation Intact to the Median, Ulnar, Radial Nerve, Motor Intact to the Median, Ulnar, Radial Nerve and Pulses Intact    MUSCULOSKELETAL EXAMINATION:    Left wrist   swelling is noted over the dorsal aspect of wrist   She has some tenderness with resisted thumb extension she has tenderness over distal radius   Range of motion strength deferred due fracture   Patient is neurovascular intact  ___________________________________________________  STUDIES REVIEWED:  Images obtained on 03/10/2021 of the Left wrist 3 views demonstrate Mild dorsal angulation left distal radius fracture      PROCEDURES PERFORMED:  Procedures  No Procedures performed today    _____________________________________________________  ASSESSMENT/PLAN:     left distal radius fracture, with EPL tendon entrapment  -  Conservative and operative treatment were discussed with the patient she would like to proceed with surgical intervention  Detail consent was obtained today   Surgery: left  Distal radius ORIF, EPL tendon release   Anesthesia:  Regional with sedation   Antibiotics:  ordered   Medical clearance: not needed   Hand therapy: ordered   Consent: obtained    patient currently takes pain medication at home    Surgery medication instructions: You will stop eating and drinking at midnight the night before your surgery, but you may continue to take your normal medications with a small sip of water  In the morning on the day of your surgery, we would like you to take the following medication:   Tylenol 500mg one tablet by mouth    After surgery, we would like you to take Tylenol 500 mg one tablet by mouth every 6 hours  (at breakfast, lunch and dinner) for 5-7 days after your surgery    Please take this medication EVERYDAY after surgery for 5-7 days, and not just as needed  Taking this medications after surgery will limit your need for prescription pain medication  We will also prescribe a narcotic pain medication for a limited time after surgery that you can take as needed for moderate or severe pain  The narcotic pain medication may also have Tylenol in it  Please limit Tylenol usage to under 3,000mg a day  Diagnoses and all orders for this visit:    Other closed extra-articular fracture of distal end of left radius, initial encounter  -     acetaminophen (TYLENOL) 500 mg tablet; Take 1 500mg tablet in the morning prior to surgery, then 1 tablet every 6 hours for 5-7 days  -     Ambulatory referral to PT/OT hand therapy; Future  -     Case request operating room: OPEN REDUCTION W/ INTERNAL FIXATION (ORIF) RADIUS Left distal radius, EPL tendon release; Standing    Other orders  -     Diet NPO; Sips with meds; Standing  -     Height and weight upon arrival; Standing  -     Nursing Communication [address] [place] [address] Interventions Implemented; Standing  -     Nursing Communication [facility] bath, have staff wash entire body (neck down) per pre-op bathing protocol  Routine, evening prior to, and day of surgery ; Standing  -     Void on call to OR; Standing  -     Insert peripheral IV; Standing  -     ceFAZolin (ANCEF) 2,000 mg in dextrose 5 % 100 mL IVPB        Follow Up:  Return for post op  Work/school status:   no restrictions    To Do Next Visit:  Re-evaluation of current issue, x-rays of left wrist, sutures out  Scribe Attestation    I,:  Felipa Lainez PA-C am acting as a scribe while in the presence of the attending physician :       I,:  Nakita Proctor MD personally performed the services described in this documentation    as scribed in my presence :           Portions of the record may have been created with voice recognition software    Occasional wrong word or "sound a like" substitutions may have occurred due to the inherent limitations of voice recognition software  Read the chart carefully and recognize, using context, where substitutions have occurred

## 2021-03-12 ENCOUNTER — ANESTHESIA EVENT (OUTPATIENT)
Dept: PERIOP | Facility: HOSPITAL | Age: 66
End: 2021-03-12
Payer: COMMERCIAL

## 2021-03-12 ENCOUNTER — APPOINTMENT (OUTPATIENT)
Dept: RADIOLOGY | Facility: HOSPITAL | Age: 66
End: 2021-03-12
Payer: COMMERCIAL

## 2021-03-12 ENCOUNTER — HOSPITAL ENCOUNTER (OUTPATIENT)
Facility: HOSPITAL | Age: 66
Setting detail: OUTPATIENT SURGERY
Discharge: HOME | End: 2021-03-12
Attending: ORTHOPAEDIC SURGERY | Admitting: ORTHOPAEDIC SURGERY
Payer: COMMERCIAL

## 2021-03-12 ENCOUNTER — ANESTHESIA (OUTPATIENT)
Dept: PERIOP | Facility: HOSPITAL | Age: 66
End: 2021-03-12
Payer: COMMERCIAL

## 2021-03-12 VITALS
SYSTOLIC BLOOD PRESSURE: 101 MMHG | RESPIRATION RATE: 20 BRPM | WEIGHT: 183.2 LBS | DIASTOLIC BLOOD PRESSURE: 62 MMHG | HEIGHT: 61 IN | TEMPERATURE: 98.3 F | OXYGEN SATURATION: 94 % | HEART RATE: 78 BPM | BODY MASS INDEX: 34.59 KG/M2

## 2021-03-12 PROCEDURE — C1713 ANCHOR/SCREW BN/BN,TIS/BN: HCPCS | Performed by: ORTHOPAEDIC SURGERY

## 2021-03-12 PROCEDURE — 25607 OPTX DST RD XARTC FX/EPI SEP: CPT | Performed by: ORTHOPAEDIC SURGERY

## 2021-03-12 PROCEDURE — 25000 INCISION OF TENDON SHEATH: CPT | Performed by: PHYSICIAN ASSISTANT

## 2021-03-12 PROCEDURE — 25607 OPTX DST RD XARTC FX/EPI SEP: CPT | Performed by: PHYSICIAN ASSISTANT

## 2021-03-12 PROCEDURE — 25000 INCISION OF TENDON SHEATH: CPT | Performed by: ORTHOPAEDIC SURGERY

## 2021-03-12 PROCEDURE — 73100 X-RAY EXAM OF WRIST: CPT

## 2021-03-12 DEVICE — 2.3MM X 20MM LOCKING CORTICAL SCREW
Type: IMPLANTABLE DEVICE | Site: WRIST | Status: FUNCTIONAL
Brand: ACUMED

## 2021-03-12 DEVICE — 3.5MM X 14.0MM LOCKING CORTICAL SCREW
Type: IMPLANTABLE DEVICE | Site: WRIST | Status: FUNCTIONAL
Brand: ACUMED

## 2021-03-12 DEVICE — ACU-LOC® 2 VDR PLT STD L
Type: IMPLANTABLE DEVICE | Site: WRIST | Status: FUNCTIONAL
Brand: ACUMED

## 2021-03-12 DEVICE — 3.5MM X 12.0MM LOCKING CORTICAL SCREW
Type: IMPLANTABLE DEVICE | Site: WRIST | Status: FUNCTIONAL
Brand: ACUMED

## 2021-03-12 DEVICE — 2.3MM X 18MM LOCKING CORTICAL SCREW
Type: IMPLANTABLE DEVICE | Site: WRIST | Status: FUNCTIONAL
Brand: ACUMED

## 2021-03-12 DEVICE — 2.3MM X 18MM LOCKING CORTICAL PEG
Type: IMPLANTABLE DEVICE | Site: WRIST | Status: FUNCTIONAL
Brand: ACUMED

## 2021-03-12 DEVICE — 2.3MM X 20MM LOCKING CORTICAL PEG
Type: IMPLANTABLE DEVICE | Site: WRIST | Status: FUNCTIONAL
Brand: ACUMED

## 2021-03-12 DEVICE — 2.3MM X 22MM LOCKING CORTICAL SCREW
Type: IMPLANTABLE DEVICE | Site: WRIST | Status: FUNCTIONAL
Brand: ACUMED

## 2021-03-12 RX ORDER — CEFAZOLIN SODIUM 2 G/50ML
2000 SOLUTION INTRAVENOUS ONCE
Status: COMPLETED | OUTPATIENT
Start: 2021-03-12 | End: 2021-03-12

## 2021-03-12 RX ORDER — ROPIVACAINE HYDROCHLORIDE 5 MG/ML
INJECTION, SOLUTION EPIDURAL; INFILTRATION; PERINEURAL
Status: COMPLETED | OUTPATIENT
Start: 2021-03-12 | End: 2021-03-12

## 2021-03-12 RX ORDER — TRAMADOL HYDROCHLORIDE 50 MG/1
50 TABLET ORAL EVERY 6 HOURS SCHEDULED
Status: DISCONTINUED | OUTPATIENT
Start: 2021-03-12 | End: 2021-03-12 | Stop reason: HOSPADM

## 2021-03-12 RX ORDER — MIDAZOLAM HYDROCHLORIDE 2 MG/2ML
INJECTION, SOLUTION INTRAMUSCULAR; INTRAVENOUS
Status: COMPLETED | OUTPATIENT
Start: 2021-03-12 | End: 2021-03-12

## 2021-03-12 RX ORDER — LIDOCAINE HYDROCHLORIDE 10 MG/ML
INJECTION, SOLUTION EPIDURAL; INFILTRATION; INTRACAUDAL; PERINEURAL
Status: COMPLETED | OUTPATIENT
Start: 2021-03-12 | End: 2021-03-12

## 2021-03-12 RX ORDER — LIDOCAINE HYDROCHLORIDE 20 MG/ML
INJECTION, SOLUTION INFILTRATION; PERINEURAL
Status: COMPLETED | OUTPATIENT
Start: 2021-03-12 | End: 2021-03-12

## 2021-03-12 RX ORDER — PROPOFOL 10 MG/ML
INJECTION, EMULSION INTRAVENOUS CONTINUOUS PRN
Status: DISCONTINUED | OUTPATIENT
Start: 2021-03-12 | End: 2021-03-12

## 2021-03-12 RX ORDER — PROPOFOL 10 MG/ML
INJECTION, EMULSION INTRAVENOUS AS NEEDED
Status: DISCONTINUED | OUTPATIENT
Start: 2021-03-12 | End: 2021-03-12

## 2021-03-12 RX ORDER — SODIUM CHLORIDE, SODIUM LACTATE, POTASSIUM CHLORIDE, CALCIUM CHLORIDE 600; 310; 30; 20 MG/100ML; MG/100ML; MG/100ML; MG/100ML
125 INJECTION, SOLUTION INTRAVENOUS CONTINUOUS
Status: DISCONTINUED | OUTPATIENT
Start: 2021-03-12 | End: 2021-03-12 | Stop reason: HOSPADM

## 2021-03-12 RX ORDER — ONDANSETRON 2 MG/ML
4 INJECTION INTRAMUSCULAR; INTRAVENOUS EVERY 6 HOURS PRN
Status: DISCONTINUED | OUTPATIENT
Start: 2021-03-12 | End: 2021-03-12 | Stop reason: HOSPADM

## 2021-03-12 RX ORDER — MAGNESIUM HYDROXIDE 1200 MG/15ML
LIQUID ORAL AS NEEDED
Status: DISCONTINUED | OUTPATIENT
Start: 2021-03-12 | End: 2021-03-12 | Stop reason: HOSPADM

## 2021-03-12 RX ORDER — ACETAMINOPHEN 325 MG/1
650 TABLET ORAL EVERY 6 HOURS PRN
Status: DISCONTINUED | OUTPATIENT
Start: 2021-03-12 | End: 2021-03-12 | Stop reason: HOSPADM

## 2021-03-12 RX ORDER — FENTANYL CITRATE 50 UG/ML
INJECTION, SOLUTION INTRAMUSCULAR; INTRAVENOUS
Status: COMPLETED | OUTPATIENT
Start: 2021-03-12 | End: 2021-03-12

## 2021-03-12 RX ADMIN — SODIUM CHLORIDE, SODIUM LACTATE, POTASSIUM CHLORIDE, AND CALCIUM CHLORIDE 125 ML/HR: .6; .31; .03; .02 INJECTION, SOLUTION INTRAVENOUS at 10:31

## 2021-03-12 RX ADMIN — PROPOFOL 80 MCG/KG/MIN: 10 INJECTION, EMULSION INTRAVENOUS at 12:23

## 2021-03-12 RX ADMIN — MIDAZOLAM HYDROCHLORIDE 2 MG: 1 INJECTION, SOLUTION INTRAMUSCULAR; INTRAVENOUS at 11:02

## 2021-03-12 RX ADMIN — LIDOCAINE HYDROCHLORIDE 5 ML: 10 INJECTION, SOLUTION EPIDURAL; INFILTRATION; INTRACAUDAL; PERINEURAL at 12:23

## 2021-03-12 RX ADMIN — FENTANYL CITRATE 100 MCG: 50 INJECTION, SOLUTION INTRAMUSCULAR; INTRAVENOUS at 11:02

## 2021-03-12 RX ADMIN — CEFAZOLIN SODIUM 2000 MG: 2 SOLUTION INTRAVENOUS at 11:45

## 2021-03-12 RX ADMIN — LIDOCAINE HYDROCHLORIDE 10 ML: 20 INJECTION, SOLUTION INFILTRATION; PERINEURAL at 11:02

## 2021-03-12 RX ADMIN — ROPIVACAINE HYDROCHLORIDE 20 ML: 5 INJECTION, SOLUTION EPIDURAL; INFILTRATION; PERINEURAL at 11:02

## 2021-03-12 RX ADMIN — PROPOFOL 20 MG: 10 INJECTION, EMULSION INTRAVENOUS at 12:31

## 2021-03-12 RX ADMIN — PROPOFOL 40 MG: 10 INJECTION, EMULSION INTRAVENOUS at 12:23

## 2021-03-12 RX ADMIN — LIDOCAINE HYDROCHLORIDE 3 ML: 10 INJECTION, SOLUTION EPIDURAL; INFILTRATION; INTRACAUDAL; PERINEURAL at 11:02

## 2021-03-12 NOTE — ANESTHESIA PREPROCEDURE EVALUATION
Procedure:  OPEN REDUCTION W/ INTERNAL FIXATION (ORIF) RADIUS Left distal radius, EPL tendon release (Left Wrist)    Relevant Problems   CARDIO   (+) Essential hypertension      GI/HEPATIC   (+) Gastroesophageal reflux disease without esophagitis      PULMONARY   (+) SOB (shortness of breath)      Other   (+) S/P gastric bypass      Anxiety    Depression    Hypertension    DVT (deep venous thrombosis) (HCC)        Physical Exam    Airway    Mallampati score: II  TM Distance: >3 FB  Neck ROM: full     Dental       Cardiovascular  Cardiovascular exam normal    Pulmonary  Pulmonary exam normal     Other Findings        Anesthesia Plan  ASA Score- 2     Anesthesia Type- general and regional with ASA Monitors  Additional Monitors:   Airway Plan:           Plan Factors-Exercise tolerance (METS): >4 METS  Chart reviewed  EKG reviewed  Imaging results reviewed  Existing labs reviewed  Patient summary reviewed  Induction- intravenous  Postoperative Plan-     Informed Consent- Anesthetic plan and risks discussed with patient  I personally reviewed this patient with the CRNA  Discussed and agreed on the Anesthesia Plan with the CRNA  Tima Lee

## 2021-03-12 NOTE — OP NOTE
OPERATIVE REPORT    PATIENT NAME: Steve Peres    MEDICAL RECORD NO:  [id number]    PROCEDURE DATE:  21    :  1955    SURGEON:  HAIDER Zamorano , Ph D     Cele Botello:  Branden Greenfield PA-C; LYN Carlson    No qualified resident was available to assist for this surgery  A Physician Assistant was used to aid in reduction and retraction while the orthopedic hardware was placed  PREOPERATIVE DIAGNOSIS:    1   left distal radius 1 part extra-articular fracture   2  Left EPL tendon sheath hematoma    POSTOPERATIVE DIAGNOSIS:    1  left distal radius 1 part extra-articular fracture   2  Left EPL tendon sheath hematoma    PROCEDURE PERFORMED:    1  Open reduction internal fixation of left distal radius  2  Left EPL tendon release and hematoma evacuation    ANESTHESIA:  Regional and conscious sedation    COMPLICATIONS: none    TOURNIQUET TIME:  31 minutes at 250 mmHg     EQUIPMENT USED:  standard Aculoc plating system     DISPOSITION: Patient was sent to the PACU in stable condition  INDICATIONS: Patient is a 63 y o  female who suffered a left distal radius extra-articular fracture as determined by imaging studies  Surgical intervention was offered and the risks and benefits of open reduction and internal fixation were explained  Consent was obtained for surgical intervention  PROCEDURE:  The patient was identified in the preoperative screening area  Consent was signed and verified after identifying the correct operative site  The patient was taken back to the operating room after receiving axillary block in the pre-op holding area  Regional and conscious sedation was provided  The patients left upper extremity was prepped and draped in normal sterile fashion with chlorhexidine solution  The arm was then elevated and exsanguinated with an Esmarch and tourniquet placed about the brachium was insufflated to 250 mmHg      A volar longitudinal incision was made over the FCR tendon approximately 6-8cm in length ending just proximal to the wrist flexion crease  Subcutaneous tissue was dissected sharply and superficial vessels were cauterized or preserved where possible  The superficial and deep portions of the FCR sheath were incised and the FCR tendon was reflected ulnarly along with the deeper FPL tendon  The pronator quadratus was identified and released off its radial attachment  The fracture site was identified and was irrigated and cleaned of debris  The fracture was then provisionally reduced and held with K-wires  Once provisional reduction was achieved, the standard Aculoc plate was placed over the fracture site and provisionally stabilized with 0 054 K-wires  Intra-operative fluoroscopic imaging demonstrated good alignment of the fracture fragments and good positioning of the plate  The distal portion of the plate was then secured first  The distal row was filled with 2 4 mm locking pegs and screws of appropriate length  The two styloid holes of the plate were filled with 2 4 mm locking pegs of appropriate length  The temporary K-wires were then removed  Final manipulation of the fracture was then performed prior to securing the plate to the proximal fragment  The plate was secured to the proximal fragment using one 3 5 mm bicortical non-locking screw placed in compression  Two 3 5 mm bicortical locking screws were then placed to lock the final construct  Screws of appropriate length were placed  Fracture stabilization was assessed and found to be stable and secure  Final intra-operative fluoroscopic images were obtained demonstrating good reduction of the fracture and good placement of the hardware  The wound was irrigated with copious amounts of saline solution  The pronator quadratus was then repaired back to the radial margin with 3-0 Ethibond sutures placed in a figure-8 fashion    Good soft tissue interposition between the plate and flexor tendons was achieved  The tendons were riding over soft tissue and not in direct contact with the plate  Next attention was turned towards the dorsal side  A longitudinal incision was made over the FPL tendon  Incision was approximately 3 cm in length  Soft tissue was dissected bluntly down to the extensor sheath was identified  By postop distally at the hematoma in the distal EPL tendon sheath, the extensor sheath and EPL tendon could be identified proximally  The extensor retinaculum was released over the 3rd dorsal compartment and extensor pollicis longus  Media hematoma was identified  The extensor sheath was released from proximal to distal to complete a full release of the EPL tendon  There were nerve sharp bony fragments within the fibro-osseous sheath  Wound was then irrigated with copious amounts saline solution  The Tourniquet was released at approximately 31 minutes with good capillary refill and color in the digits  Hemostasis was achieved  The skin was then closed with 4-0 nylon suture in a horizontal mattress fashion  The wound was dressed in a sterile dressing and the wrist was immobilized in a volar wrist splint  The patient tolerated the procedure well, was awakened in the operating room, and sent to the PACU in stable condition  As no first assistant was provided by the hospital, it was elected to use a physician's assistant to stabilize the extremity and aid in instrumentation       I was present for the entire procedure     Patient Disposition:  PACU      SIGNATURE: Darren Diaz MD/PhD  DATE: 03/12/21  TIME:  11:58 AM

## 2021-03-12 NOTE — ANESTHESIA PROCEDURE NOTES
Peripheral Block    Patient location during procedure: pre-op  Start time: 3/12/2021 11:00 AM  Reason for block: at surgeon's request and post-op pain management  Staffing  Anesthesiologist: Sonia Blood MD  Performed: anesthesiologist   Preanesthetic Checklist  Completed: patient identified, site marked, surgical consent, pre-op evaluation, timeout performed, IV checked, risks and benefits discussed and monitors and equipment checked  Peripheral Block  Patient position: supine  Prep: ChloraPrep  Patient monitoring: heart rate and cardiac monitor  Block type: supraclavicular  Laterality: left  Injection technique: single-shot  Procedures: ultrasound guided, Ultrasound guidance required for the procedure to increase accuracy and safety of medication placement and decrease risk of complications    Ultrasound permanent image savedlidocaine (PF) (XYLOCAINE-MPF) 1 % infiltration, 3 mL  ropivacaine (NAROPIN) 0 5 % perineural infiltration, 20 mL  fentaNYL 50 mcg/mL IV, 100 mcg  midazolam (VERSED) 2 mg/2 mL IV, 2 mg  lidocaine (XYLOCAINE) 2 % infiltration, 10 mL  Needle  Needle type: Stimuplex   Needle gauge: 22 G  Needle length: 5 cm  Needle localization: anatomical landmarks and ultrasound guidance  Needle insertion depth: 2 cm  Test dose: negative  Assessment  Injection assessment: incremental injection, local visualized surrounding nerve on ultrasound, negative aspiration for heme and no paresthesia on injection  Heart rate change: no  Slow fractionated injection: yes  Post-procedure:  site cleaned  patient tolerated the procedure well with no immediate complications

## 2021-03-12 NOTE — DISCHARGE INSTRUCTIONS
Post Operative Instructions    You have had surgery on your arm today, please read and follow the information below:  · Elevate your hand above your elbow during the next 24-48 hours to help with swelling  · Place your hand and arm over your head with motion at your shoulder three times a day  · Do not apply any cream/ointment/oil to your incisions including antibiotics  · Do not soak your hands in standing water (dishwater, tubs, Jacuzzi's, pools, etc ) until given permission (typically 2-3 weeks after injury)    Call the office at [phone]  if you notice any:  · Increased numbness or tingling of your hand or fingers that is not relieved with elevation  · Increasing pain that is not controlled with medication  · Difficulty chewing, breathing, swallowing  · Chest pains or shortness of breath  · Fever over 101 4 degrees  Bandage: Remove bandage after 5 days  Motion: Move fingers into a fist 5 times a day, DO NOT move any splinted fingers  Weight bearing status: The operated extremity should be non-weight bearing until further notice  Ice: Ice for 10 minutes every hour as needed for swelling x 24 hours  Sling: Sling for comfort for 2-3 days, or until pain block wears off  Pain medication: A prescription for pain medication was provided in the office and sent to your pharmacy  Your prescription pain medication also contains Tylenol  Please limit total Tylenol dose to under 3,000 mg a day  { [email or web address]:[id number]}    If the pain becomes severe, and the pain medication is not alleviating symptoms, The greatest source of pain after surgery is usually a tight dressing due to increased swelling after surgery  If the pain becomes severe after surgery, and the patient medication is NOT alleviating the symptoms, the patient should do the following: The patient should  loosen the top dressing (usually coban or an ace bandage) and loosen/cut the rolled gauze beneath    The 4x4 gauze that is directly covering the incision should remain in place  The splint (if the patient has one) should remain in place  The ace bandage/coban can then be replaced on top in a less constrictive manor  If this does not help relieve the pain/numbness in a few hours, the patient should call our office (number listed below)  and we can have them seen in the office for further evaluation  Follow-up Appointment: 7-10 days with Dr Salima De Leon  Occupational Therapy: ***  {remove bandage:[id number]::"AFTER THE FIRST THERAPY APPOINTMENT, the patient may remove the splint/dressing for showering and clean the incision with soap and water  Keep incision dry after washing  Do not expose the incision to dirty water (oceans, pools, hot tubs, etc) "}    If you need help scheduling Therapy, you can call [phone]        Please call the office at [phone] if you have any questions or concerns regarding your post-operative care

## 2021-03-12 NOTE — INTERVAL H&P NOTE
H&P reviewed  After examining the patient I find no changes in the patients condition since the H&P had been written      Vitals:    03/12/21 1011   BP: 131/76   Pulse: 77   Resp: 16   Temp: 98 2 °F (36 8 °C)   SpO2: 100%

## 2021-03-17 ENCOUNTER — EVALUATION (OUTPATIENT)
Dept: OCCUPATIONAL THERAPY | Facility: CLINIC | Age: 66
End: 2021-03-17
Payer: COMMERCIAL

## 2021-03-17 DIAGNOSIS — S52.552D OTHER CLOSED EXTRA-ARTICULAR FRACTURE OF DISTAL END OF LEFT RADIUS WITH ROUTINE HEALING, SUBSEQUENT ENCOUNTER: ICD-10-CM

## 2021-03-17 PROCEDURE — 97760 ORTHOTIC MGMT&TRAING 1ST ENC: CPT | Performed by: OCCUPATIONAL THERAPIST

## 2021-03-17 PROCEDURE — 97165 OT EVAL LOW COMPLEX 30 MIN: CPT | Performed by: OCCUPATIONAL THERAPIST

## 2021-03-17 NOTE — PROGRESS NOTES
OT Evaluation     Today's date: 3/17/2021  Patient name: Ken Montesinos  : 1955  MRN: [id number]  Referring provider: Donnell Valenzuela  Dx:   Encounter Diagnosis     ICD-10-CM    1  Other closed extra-articular fracture of distal end of left radius, initial encounter  S53 [id number] Ambulatory referral to PT/OT hand therapy                  Assessment  Assessment details: Geena Berrios is a 64 y/o right handed female who reports falling at home  She reports falling during the night while going to the bathroom  DOI 3/9/21 with urgent care same day  Referred to orthopedic and DOS for distal radius ORIF on 3/12/21  Examination reveals decreased AROM, mild to moderate pain, mild edema, no sensory changes  Strength not assessed due to early post op status  Evaluation is of low complexity, due to minimal comorbidities and stable clinical presentation Pt demonstrates good tolerance of therapy today and was provided with a written HEP focusing on suture care, orthotic wear, and gentle AROM exercises for the FA, wrist, and digits    Pt was instructed to perform HEP daily as tolerated  Pt will benefit from skilled OT intervention to progress motion, strength, function,  and allow return to PLOF  Impairments: abnormal or restricted ROM, impaired physical strength, lacks appropriate home exercise program and pain with function  Other impairment: edema, healing surgical site  Functional limitations: Left hand available for light self care only due to healing fx  Symptom irritability: lowBarriers to therapy: Transportation  Understanding of Dx/Px/POC: excellent  Goals  STG 2 weeks   Increase wrist arc by at least 10 degrees in extension and flexion   Increase forearm rotation by at least 10 degrees in supination and pronation      Reduce edema to a minimal level   Reduce pain at rest to less than 2/10  LTG  By discharge   Achieve functional wrist arc, >80 degrees   Achieve functional FA arc > 160   Achieve composite flexion of all digits   Achieve  strength to at least 50% of the uninvolved   Achieve FOTO goals established at IE  Plan  Patient would benefit from: skilled occupational therapy  Planned modality interventions: thermotherapy: hydrocollator packs  Planned therapy interventions: therapeutic activities, therapeutic exercise, stretching, strengthening, patient education, orthotic management and training, manual therapy, joint mobilization and home exercise program  Other planned therapy interventions: Per protocol for healing fracture  Frequency: 2x week  Duration in weeks: 8  Plan of Care beginning date: 3/17/2021  Plan of Care expiration date: 2021  Treatment plan discussed with: patient        Subjective Evaluation    History of Present Illness  Date of surgery: 3/12/2021  Mechanism of injury: surgery and trauma  Mechanism of injury: Fall at home,  3/9/2021,  DOS  3/12/2021          Not a recurrent problem   Quality of life: good    Pain  Current pain ratin  Quality: sharp, throbbing, tight and pulling  Relieving factors: rest and change in position    Social Support  Lives with: spouse    Employment status: not working  Hand dominance: right      Diagnostic Tests  X-ray: abnormal  Treatments  No previous or current treatments  Patient Goals  Patient goals for therapy: decreased edema, decreased pain, increased motion, increased strength and independence with ADLs/IADLs  Patient goal: Use my left hand normally        Objective     Observations     Left Wrist/Hand   Positive for edema and incision  Additional Observation Details  Post op incision clean, no drainage, no bleeding, no sign of infection    Sutures intact    Neurological Testing     Sensation     Wrist/Hand   Left   Intact: light touch    Additional Neurological Details  No sensory changes reported    Active Range of Motion     Left Elbow   Forearm supination: 30 degrees   Forearm pronation: 80 degrees     Left Wrist Wrist flexion: 35 degrees   Wrist extension: 47 degrees     Right Wrist   Wrist flexion: 75 degrees   Wrist extension: 68 degrees     Swelling     Left Wrist/Hand   Circumference MCP: 16 7 cm  Circumference wrist: 19 8 cm    Right Wrist/Hand   Circumference MCP: 16 cm  Circumference wrist: 18 8 cm             Precautions:  Universal       Manuals 3/17                                                                Neuro Re-Ed                                                                                                        Ther Ex 15'            HEP Ortho fit/train  AROM ex                                                                                                       Ther Activity                                       Gait Training                                       Modalities

## 2021-03-22 DIAGNOSIS — R06.02 SOB (SHORTNESS OF BREATH): Primary | ICD-10-CM

## 2021-03-23 ENCOUNTER — OFFICE VISIT (OUTPATIENT)
Dept: OCCUPATIONAL THERAPY | Facility: CLINIC | Age: 66
End: 2021-03-23
Payer: COMMERCIAL

## 2021-03-23 DIAGNOSIS — S52.552D OTHER CLOSED EXTRA-ARTICULAR FRACTURE OF DISTAL END OF LEFT RADIUS WITH ROUTINE HEALING, SUBSEQUENT ENCOUNTER: Primary | ICD-10-CM

## 2021-03-23 PROCEDURE — 97110 THERAPEUTIC EXERCISES: CPT | Performed by: OCCUPATIONAL THERAPIST

## 2021-03-23 PROCEDURE — 97140 MANUAL THERAPY 1/> REGIONS: CPT | Performed by: OCCUPATIONAL THERAPIST

## 2021-03-23 NOTE — PROGRESS NOTES
Daily Note     Today's date: 3/23/2021  Patient name: Steve Peres  : 1955  MRN: [id number]  Referring provider: Get Brewster PA-C  Dx: No diagnosis found  Subjective:  "I feel good but I can't lift anything too heavy"      Objective: See treatment diary below  HEP and splint wear reviewed to reduce hand edema  AROM  FA S/P  80/90  Wrist E/F L   45/50  R  70/65  Digits WNL      Assessment: Tolerated treatment well  Patient would benefit from continued OT      Plan: Progress treament per protocol  Sutures out on 3/25/21    Add scar care NV     Precautions:  Universal       Manuals 3/17 3/23           Edema care  10'           Scar care  NV                                     Neuro Re-Ed                                                                                                        Ther Ex 15'   15'           HEP Ortho fit/train  AROM ex HEP reviewedema splint fit                                                                                                      Ther Activity                                       Gait Training                                       Modalities

## 2021-03-25 ENCOUNTER — OFFICE VISIT (OUTPATIENT)
Dept: OBGYN CLINIC | Facility: CLINIC | Age: 66
End: 2021-03-25
Payer: COMMERCIAL

## 2021-03-25 ENCOUNTER — APPOINTMENT (OUTPATIENT)
Dept: RADIOLOGY | Facility: CLINIC | Age: 66
End: 2021-03-25
Payer: COMMERCIAL

## 2021-03-25 ENCOUNTER — OFFICE VISIT (OUTPATIENT)
Dept: OCCUPATIONAL THERAPY | Facility: CLINIC | Age: 66
End: 2021-03-25
Payer: COMMERCIAL

## 2021-03-25 VITALS
BODY MASS INDEX: 34.55 KG/M2 | DIASTOLIC BLOOD PRESSURE: 84 MMHG | SYSTOLIC BLOOD PRESSURE: 122 MMHG | HEART RATE: 71 BPM | WEIGHT: 183 LBS | HEIGHT: 61 IN

## 2021-03-25 DIAGNOSIS — S52.552A OTHER CLOSED EXTRA-ARTICULAR FRACTURE OF DISTAL END OF LEFT RADIUS, INITIAL ENCOUNTER: ICD-10-CM

## 2021-03-25 DIAGNOSIS — Z48.89 AFTERCARE FOLLOWING SURGERY: ICD-10-CM

## 2021-03-25 DIAGNOSIS — S52.552A OTHER CLOSED EXTRA-ARTICULAR FRACTURE OF DISTAL END OF LEFT RADIUS, INITIAL ENCOUNTER: Primary | ICD-10-CM

## 2021-03-25 DIAGNOSIS — S52.552D OTHER CLOSED EXTRA-ARTICULAR FRACTURE OF DISTAL END OF LEFT RADIUS WITH ROUTINE HEALING, SUBSEQUENT ENCOUNTER: Primary | ICD-10-CM

## 2021-03-25 DIAGNOSIS — M18.12 PRIMARY OSTEOARTHRITIS OF FIRST CARPOMETACARPAL JOINT OF LEFT HAND: ICD-10-CM

## 2021-03-25 PROCEDURE — 3079F DIAST BP 80-89 MM HG: CPT | Performed by: ORTHOPAEDIC SURGERY

## 2021-03-25 PROCEDURE — 20600 DRAIN/INJ JOINT/BURSA W/O US: CPT | Performed by: ORTHOPAEDIC SURGERY

## 2021-03-25 PROCEDURE — 3074F SYST BP LT 130 MM HG: CPT | Performed by: ORTHOPAEDIC SURGERY

## 2021-03-25 PROCEDURE — 97110 THERAPEUTIC EXERCISES: CPT | Performed by: OCCUPATIONAL THERAPIST

## 2021-03-25 PROCEDURE — 99024 POSTOP FOLLOW-UP VISIT: CPT | Performed by: ORTHOPAEDIC SURGERY

## 2021-03-25 PROCEDURE — 73110 X-RAY EXAM OF WRIST: CPT

## 2021-03-25 PROCEDURE — 1036F TOBACCO NON-USER: CPT | Performed by: ORTHOPAEDIC SURGERY

## 2021-03-25 RX ORDER — TRIAMCINOLONE ACETONIDE 40 MG/ML
20 INJECTION, SUSPENSION INTRA-ARTICULAR; INTRAMUSCULAR
Status: COMPLETED | OUTPATIENT
Start: 2021-03-25 | End: 2021-03-25

## 2021-03-25 RX ORDER — LIDOCAINE HYDROCHLORIDE 10 MG/ML
2.5 INJECTION, SOLUTION INFILTRATION; PERINEURAL
Status: COMPLETED | OUTPATIENT
Start: 2021-03-25 | End: 2021-03-25

## 2021-03-25 RX ADMIN — TRIAMCINOLONE ACETONIDE 20 MG: 40 INJECTION, SUSPENSION INTRA-ARTICULAR; INTRAMUSCULAR at 13:30

## 2021-03-25 RX ADMIN — Medication 0.05 MEQ: at 13:30

## 2021-03-25 RX ADMIN — LIDOCAINE HYDROCHLORIDE 2.5 ML: 10 INJECTION, SOLUTION INFILTRATION; PERINEURAL at 13:30

## 2021-03-25 NOTE — PROGRESS NOTES
SUBJECTIVE:  Will Contreras is a 63y o  year old female who presents for follow up after surgery, Left distal radius ORIF and EPL tendon release and hematoma evacuation performed on  3/12/2021  Today patient states that she has been removing splint at rest  PT states that she has [facility] OA and has had 10 CSI in the past with relief  Pt states that she feels that her splint has been irritating her [facility] OA  VITALS:  Vitals:    03/25/21 1256   BP: 122/84   Pulse: 71       PHYSICAL EXAMINATION:  General: well developed and well nourished, alert, oriented times 3 and appears comfortable  Psychiatric: Normal    MUSCULOSKELETAL EXAMINATION:  Left wrist   Incision: Clean, dry, with sutures intact  Range of Motion: limited   Neurovascular status: Neuro intact, good cap refill    Left thumb CMC grind test positive for crepitus and pain     STUDIES REVIEWED:  xrays of the left wrist performed today show maintained alignment of distal radius fracture, hardware intact, significant degenerative changes at the [facility] joint       PROCEDURES PERFORMED:  Small joint arthrocentesis: L thumb CMC  [place] Protocol:  Consent: Verbal consent obtained    Risks and benefits: risks, benefits and alternatives were discussed  Consent given by: patient  Patient understanding: patient states understanding of the procedure being performed  Patient consent: the patient's understanding of the procedure matches consent given  Site marked: the operative site was marked  Supporting Documentation  Indications: pain   Procedure Details  Location: thumb - L thumb CMC  Preparation: Patient was prepped and draped in the usual sterile fashion  Needle size: 27 G  Ultrasound guidance: no  Medications administered: 0 05 mEq sodium bicarbonate 8 4 %; 20 mg triamcinolone acetonide 40 mg/mL; 2 5 mL lidocaine 1 %    Patient tolerance: patient tolerated the procedure well with no immediate complications  Dressing:  Sterile dressing applied              ASSESSMENT/PLAN:    S/P ORIF left distal radius with left EPL tendon release and hematoma evacuation 3/12/21   Sutures were removed without complications   Pt was advised to continue therapy and HEP    Pt was advised to continue splint in public and with activities but remove at home to work consistently on HEP   Pt was advised to avoid heavy lifting until follow up apt    Pt will follow up in the office in 6 weeks at which time we will perform repeat xrays    Left CMC Osteoarthritis  *CSI was administered today without complications  *Pt was advised that they may have increased pain for the next 24-36 hours from the CSI before feeling relief, during this time pt was advised to take NSAIDs and apply ice  *After the initial 36 hours pt is advised to apply heat and continue motion of the thumb to decrease discomfort  *Pt was advised that activity modification such as resting after increased activity or taking NSAIDs prior to increased activity may be necessary    *Pt was advised that these CSI should not be administered more frequently than 3 months apart        FOLLOW UP:  No follow-ups on file        TO DO AT NEXT VISIT:  Re-evaluation of current issue      Scribe Attestation    I,:  Agnieszka Coelho am acting as a scribe while in the presence of the attending physician :       I,:  Laura Beauchamp MD personally performed the services described in this documentation    as scribed in my presence :

## 2021-03-25 NOTE — PROGRESS NOTES
Daily Note     Today's date: 3/25/2021  Patient name: Daphne Patel  : 1955  MRN: [id number]  Referring provider: Deisy Stewart PA-C  Dx: No diagnosis found  Subjective:  "I just saw the doc, and he injected my thumb"      Objective: See treatment diary below  HEP and splint wear reviewed to reduce hand edema  Pt  Instructed to hold HEP for 72 hours due to injection of cortisone to the [facility] joint  Scar and edema care and HEP reviewed today  Hold clinic exercises  AROM  FA S/P  80/90  Wrist E/F L   45/50  R  70/65  Digits WNL      Assessment: Tolerated treatment well  Patient would benefit from continued OT  Painful CMC joint has increased  Plan: Progress treament per protocol  Hold clinic program until next week due to injection today  Continue splint wear  Applied kinesiotape to the [facility] for pain assistance  Next week, resume care and progress as pain allows  Precautions:  Universal       Manuals 3/17 3/23 3/25          Edema care  10'           Scar care  NV 5'          Kinesiotaping      5'                       Neuro Re-Ed                                                                                                        Ther Ex 15'   15'  10'          HEP Ortho fit/train  AROM ex HEP reviewedema splint fit HEP post injection; pain; ROM                                                                                                      Ther Activity                                       Gait Training                                       Modalities

## 2021-03-29 ENCOUNTER — APPOINTMENT (OUTPATIENT)
Dept: OCCUPATIONAL THERAPY | Facility: CLINIC | Age: 66
End: 2021-03-29
Payer: COMMERCIAL

## 2021-03-30 DIAGNOSIS — Z23 ENCOUNTER FOR IMMUNIZATION: ICD-10-CM

## 2021-04-01 ENCOUNTER — OFFICE VISIT (OUTPATIENT)
Dept: OCCUPATIONAL THERAPY | Facility: CLINIC | Age: 66
End: 2021-04-01
Payer: COMMERCIAL

## 2021-04-01 DIAGNOSIS — S52.552D OTHER CLOSED EXTRA-ARTICULAR FRACTURE OF DISTAL END OF LEFT RADIUS WITH ROUTINE HEALING, SUBSEQUENT ENCOUNTER: Primary | ICD-10-CM

## 2021-04-01 PROCEDURE — 97110 THERAPEUTIC EXERCISES: CPT | Performed by: OCCUPATIONAL THERAPIST

## 2021-04-01 PROCEDURE — 97140 MANUAL THERAPY 1/> REGIONS: CPT | Performed by: OCCUPATIONAL THERAPIST

## 2021-04-02 NOTE — PROGRESS NOTES
Daily Note     Today's date: 2021  Patient name: Luzmaria Purcell  : 1955  MRN: [id number]  Referring provider: Promise Watts PA-C  Dx: No diagnosis found  Subjective:  "I just saw the doc, and he injected my thumb"  Thumb trigger  Objective: See treatment diary below  HEP and splint wear reviewed to reduce hand edema  Pt  Instructed to hold HEP for 72 hours due to injection of cortisone to the volar thumbt  Scar and edema care and HEP reviewed today  Hold clinic exercises  AROM  FA S/P  80/90  Wrist E/F L   55/55  R  70/65  Digits WNL      Assessment: Tolerated treatment well  Patient would benefit from continued OT  Painful CMC joint has increased  Trigger resolved today  Plan: Progress treament per protocol  Continue splint wear  Applied kinesiotape to the [facility] for pain assistance  Next week, resume care and progress as pain allows  Reduce frequency to once weekly until NDV  Precautions:  Universal       Manuals 3/17 3/23 3/25 4/1         Edema care  10'           Scar care  NV 5' 5'         Kinesiotaping      5' 5'                      Neuro Re-Ed                                                                                                        Ther Ex 15'   15'  10'   10'         HEP Ortho fit/train  AROM ex HEP reviewedema splint fit HEP post injection; pain; ROM  Review to avoid trigger  5'         AAROM FA, wrist, hand    10'                                                                                       Ther Activity                                       Gait Training                                       Modalities

## 2021-04-07 ENCOUNTER — OFFICE VISIT (OUTPATIENT)
Dept: OCCUPATIONAL THERAPY | Facility: CLINIC | Age: 66
End: 2021-04-07
Payer: COMMERCIAL

## 2021-04-07 DIAGNOSIS — S52.552D OTHER CLOSED EXTRA-ARTICULAR FRACTURE OF DISTAL END OF LEFT RADIUS WITH ROUTINE HEALING, SUBSEQUENT ENCOUNTER: Primary | ICD-10-CM

## 2021-04-07 PROCEDURE — 97110 THERAPEUTIC EXERCISES: CPT | Performed by: OCCUPATIONAL THERAPIST

## 2021-04-07 PROCEDURE — 97140 MANUAL THERAPY 1/> REGIONS: CPT | Performed by: OCCUPATIONAL THERAPIST

## 2021-04-07 NOTE — PROGRESS NOTES
Daily Note     Today's date: 2021  Patient name: Alvino Grewal  : 1955  MRN: [id number]  Referring provider: Gerry Dailey PA-C  Dx: No diagnosis found  Subjective:  "I just saw the doc, and he injected my thumb"  Thumb trigger  "My thumb still hurts"      Objective: See treatment diary below  HEP and splint wear reviewed to reduce hand edema  Scar and edema care and HEP reviewed today  Hold clinic exercises  AROM  FA S/P  80/90  Wrist E/F L   58/55  R  70/65  Digits WNL      Assessment: Tolerated treatment well  Patient would benefit from continued OT  Painful CMC joint has increased  Wrist pain is minimal   Steady increased of AROM of the FA and wrist   Trigger resolved  Plan: Progress treament per protocol  Continue splint wear  Applied kinesiotape to the [facility] for pain assistance  Instructed pt in purchasing thumb comfort cool splint for pain of CMC joint  Next week, resume care and progress as pain allows  Reduce frequency to once weekly until NDV  Precautions:  Universal       Manuals 3/17 3/23 3/25 4/1 4/7        Edema care  10'           Scar care  NV 5' 5' 5'        Kinesiotaping      5' 5' 5'        Joint mobs     5'  thumb        Neuro Re-Ed                                                                                                        Ther Ex 15'   15'  10'   10'   15'        HEP Ortho fit/train  AROM ex HEP reviewedema splint fit HEP post injection; pain; ROM  Review to avoid trigger  5' CMC pain  Splint  Tape, protect  5'        AAROM FA, wrist, hand    10' 10'                                                                                      Ther Activity                                       Gait Training                                       Modalities

## 2021-04-08 ENCOUNTER — HOSPITAL ENCOUNTER (EMERGENCY)
Facility: HOSPITAL | Age: 66
Discharge: HOME | End: 2021-04-08
Attending: EMERGENCY MEDICINE
Payer: COMMERCIAL

## 2021-04-08 ENCOUNTER — APPOINTMENT (EMERGENCY)
Dept: RADIOLOGY | Facility: HOSPITAL | Age: 66
End: 2021-04-08
Payer: COMMERCIAL

## 2021-04-08 VITALS
HEART RATE: 74 BPM | TEMPERATURE: 99.4 F | DIASTOLIC BLOOD PRESSURE: 78 MMHG | RESPIRATION RATE: 17 BRPM | SYSTOLIC BLOOD PRESSURE: 115 MMHG | OXYGEN SATURATION: 95 %

## 2021-04-08 DIAGNOSIS — S61.211A LACERATION OF LEFT INDEX FINGER WITHOUT FOREIGN BODY WITHOUT DAMAGE TO NAIL, INITIAL ENCOUNTER: Primary | ICD-10-CM

## 2021-04-08 PROCEDURE — 73140 X-RAY EXAM OF FINGER(S): CPT

## 2021-04-08 PROCEDURE — 90715 TDAP VACCINE 7 YRS/> IM: CPT | Performed by: PHYSICIAN ASSISTANT

## 2021-04-08 PROCEDURE — 99284 EMERGENCY DEPT VISIT MOD MDM: CPT | Performed by: PHYSICIAN ASSISTANT

## 2021-04-08 PROCEDURE — 99283 EMERGENCY DEPT VISIT LOW MDM: CPT

## 2021-04-08 PROCEDURE — 12001 RPR S/N/AX/GEN/TRNK 2.5CM/<: CPT | Performed by: PHYSICIAN ASSISTANT

## 2021-04-08 PROCEDURE — 90471 IMMUNIZATION ADMIN: CPT

## 2021-04-08 RX ORDER — CEPHALEXIN 500 MG/1
500 CAPSULE ORAL 3 TIMES DAILY
Qty: 30 CAPSULE | Refills: 0 | Status: SHIPPED | OUTPATIENT
Start: 2021-04-08 | End: 2021-04-19

## 2021-04-08 RX ORDER — LIDOCAINE HYDROCHLORIDE AND EPINEPHRINE 10; 10 MG/ML; UG/ML
5 INJECTION, SOLUTION INFILTRATION; PERINEURAL ONCE
Status: COMPLETED | OUTPATIENT
Start: 2021-04-08 | End: 2021-04-08

## 2021-04-08 RX ADMIN — TETANUS TOXOID, REDUCED DIPHTHERIA TOXOID AND ACELLULAR PERTUSSIS VACCINE, ADSORBED 0.5 ML: 5; 2.5; 8; 8; 2.5 SUSPENSION INTRAMUSCULAR at 11:25

## 2021-04-08 RX ADMIN — LIDOCAINE HYDROCHLORIDE AND EPINEPHRINE 5 ML: 10; 10 INJECTION, SOLUTION INFILTRATION; PERINEURAL at 11:24

## 2021-04-08 NOTE — DISCHARGE INSTRUCTIONS
Suture instructions: suture removal in 10-12 days - return to ED, any urgent care or primary care physician for suture removal   apply bacitracin, neosporin or any other triple antibiotic ointment to area 3 times a day until suture removal   return to ED for any redness, purulent drainage, increasing pain or any other worrisome or worsening symptoms  do not submerge laceration in water - if it gets wet, pat it dry immediately  use tylenol or motrin over the counter as needed for pain/swelling

## 2021-04-08 NOTE — ED PROVIDER NOTES
History  Chief Complaint   Patient presents with    Finger Laceration     left index finger, nicked by power  just prior to arrival  "I taped it up but it just kept bleeding"     61 y o  female presents to the ED with chief complaint of finger laceration  Onset reported as this morning  Location is reported as left index finger  Quality is reported as laceration  Severity is reported as moderate  Associated symptoms:  Denies wrist, elbow or shoulder pain  Denies paralysis, paraesthesias or weakness to hand or upper extremity  Modifiers:  Local pressure applied to area has helped control bleeding  Hand dominance:  right  Context:  Right-hand-dominant female presents to ED with laceration to her left index finger from  earlier today  Denies numbness tingling or weakness to the left index finger other fingers of the hand  Denies other injuries  Last tetanus unknown  Patient does take blood thinners  Medical summary - past visit history reviewed via EPIC demonstrates patient last seen in the emergency department on 03/10/2021 for evaluation of wrist fracture        History provided by:  Patient and significant other   used: No        Prior to Admission Medications   Prescriptions Last Dose Informant Patient Reported? Taking? ARIPiprazole (ABILIFY) 5 mg tablet  Self Yes No   Calcium Carbonate-Vitamin D 600-400 MG-UNIT per tablet  Self Yes No   Sig: Take 2 tablets by mouth   FLUoxetine (PROzac) 40 MG capsule  Self Yes No   Sig: Take 40 mg by mouth   MULTIPLE VITAMIN PO  Self Yes No   Sig: Take by mouth   QUEtiapine (SEROquel) 50 mg tablet  Self Yes No   Sig: Take 50 mg by mouth   Xarelto 20 MG tablet 4/7/2021 at Unknown time Self Yes Yes   acetaminophen (TYLENOL) 500 mg tablet   No No   Sig: Take 1 500mg tablet in the morning prior to surgery, then 1 tablet every 6 hours for 5-7 days     buPROPion (WELLBUTRIN XL) 300 mg 24 hr tablet  Self Yes No   cyclobenzaprine (FLEXERIL) 10 mg tablet  Self Yes No   famotidine (PEPCID) 20 mg tablet  Self Yes No   Sig: Take 20 mg by mouth   fentaNYL (DURAGESIC) 25 mcg/hr  Self Yes No   Sig: Place 1 patch on the skin every third day   gabapentin (NEURONTIN) 600 MG tablet  Self Yes No   Sig: Take 1,200 mg by mouth 3 (three) times a day   metoprolol succinate (TOPROL-XL) 25 mg 24 hr tablet  Self Yes No   oxyCODONE (OxyCONTIN) 20 mg 12 hr tablet  Self Yes No   Sig: Take 20 mg by mouth every 12 (twelve) hours   zolpidem (AMBIEN CR) 12 5 MG CR tablet  Self Yes No      Facility-Administered Medications: None       Past Medical History:   Diagnosis Date    Anxiety     Depression     DVT (deep venous thrombosis) (HCC)     Hypertension        Past Surgical History:   Procedure Laterality Date     SECTION      CHOLECYSTECTOMY      COSMETIC SURGERY      GASTRIC BYPASS      IA OPEN RX DISTAL RADIUS FX, EXTRA-ARTICULAR Left 3/12/2021    Procedure: OPEN REDUCTION W/ INTERNAL FIXATION (ORIF) RADIUS Left distal radius, EPL tendon release;  Surgeon: Lv Hannon MD;  Location: [facility] OR;  Service: Orthopedics       History reviewed  No pertinent family history  I have reviewed and agree with the history as documented  E-Cigarette/Vaping    E-Cigarette Use Never User      E-Cigarette/Vaping Substances    Nicotine No     THC No     CBD No     Flavoring No     Other No     Unknown No      Social History     Tobacco Use    Smoking status: Never Smoker    Smokeless tobacco: Never Used   Substance Use Topics    Alcohol use: Not Currently     Frequency: Never    Drug use: Never       Review of Systems   Constitutional: Negative for activity change, appetite change, chills, diaphoresis, fatigue, fever and unexpected weight change     HENT: Negative for congestion, dental problem, drooling, ear discharge, ear pain, facial swelling, hearing loss, mouth sores, nosebleeds, postnasal drip, rhinorrhea, sinus pressure, sinus pain, sneezing, sore throat, tinnitus, trouble swallowing and voice change  Eyes: Negative for photophobia, pain, discharge, redness, itching and visual disturbance  Respiratory: Negative for apnea, cough, choking, chest tightness, shortness of breath, wheezing and stridor  Cardiovascular: Negative for chest pain, palpitations and leg swelling  Gastrointestinal: Negative for abdominal distention, abdominal pain, anal bleeding, blood in stool, constipation, diarrhea, nausea, rectal pain and vomiting  Endocrine: Negative for cold intolerance, heat intolerance, polydipsia, polyphagia and polyuria  Genitourinary: Negative for decreased urine volume, difficulty urinating, dyspareunia, dysuria, enuresis, flank pain, frequency, hematuria, menstrual problem, pelvic pain, urgency, vaginal bleeding, vaginal discharge and vaginal pain  Musculoskeletal: Negative for arthralgias, back pain, gait problem, joint swelling, myalgias, neck pain and neck stiffness  Skin: Positive for wound  Negative for color change, pallor and rash  Allergic/Immunologic: Negative for environmental allergies, food allergies and immunocompromised state  Neurological: Negative for dizziness, tremors, seizures, syncope, facial asymmetry, speech difficulty, weakness, light-headedness, numbness and headaches  Hematological: Negative for adenopathy  Does not bruise/bleed easily  Psychiatric/Behavioral: Negative for agitation, confusion, hallucinations, self-injury and suicidal ideas  The patient is not hyperactive  All other systems reviewed and are negative  Physical Exam  Physical Exam  Vitals signs and nursing note reviewed  Constitutional:       Appearance: Normal appearance  Comments: /78 (BP Location: Left arm)   Pulse 74   Temp 99 4 °F (37 4 °C) (Oral)   Resp 17   SpO2 95%   Breastfeeding No      HENT:      Head: Normocephalic and atraumatic        Right Ear: External ear normal       Left Ear: External ear normal       Nose: Nose normal    Eyes:      General: No scleral icterus  Right eye: No discharge  Left eye: No discharge  Conjunctiva/sclera: Conjunctivae normal    Neck:      Musculoskeletal: Normal range of motion and neck supple  No neck rigidity or muscular tenderness  Cardiovascular:      Rate and Rhythm: Normal rate  Pulses: Normal pulses  Pulmonary:      Effort: Pulmonary effort is normal  No respiratory distress  Musculoskeletal: Normal range of motion  General: Tenderness and signs of injury present  No deformity  Comments: Left hand exam:  No gross deformity  Distal neurovascular intact all fingers  Cap refills less than 3 seconds to all fingers  Flexor extensor tendon function fully intact all fingers  Strength 5/5 and normal to all fingers  Fingernails intact without subungual hematoma or avulsion to all fingers  There is a v-shaped laceration present to the distal radial surface of the D IP of the left index finger  Explored to depth  No foreign bodies present  irrigated   Skin:     Capillary Refill: Capillary refill takes less than 2 seconds  Coloration: Skin is not jaundiced  Findings: No erythema or rash  Neurological:      General: No focal deficit present  Mental Status: She is alert and oriented to person, place, and time  Mental status is at baseline  Sensory: No sensory deficit  Motor: No weakness     Psychiatric:         Mood and Affect: Mood normal          Behavior: Behavior normal          Vital Signs  ED Triage Vitals   Temperature Pulse Respirations Blood Pressure SpO2   04/08/21 1109 04/08/21 1103 04/08/21 1103 04/08/21 1103 04/08/21 1103   99 4 °F (37 4 °C) 74 17 115/78 95 %      Temp Source Heart Rate Source Patient Position - Orthostatic VS BP Location FiO2 (%)   04/08/21 1109 04/08/21 1103 04/08/21 1103 04/08/21 1103 --   Oral Monitor Sitting Left arm       Pain Score       04/08/21 1103       5 Vitals:    04/08/21 1103   BP: 115/78   Pulse: 74   Patient Position - Orthostatic VS: Sitting         Visual Acuity      ED Medications  Medications   lidocaine-epinephrine (XYLOCAINE/EPINEPHRINE) 1 %-1:100,000 injection 5 mL (5 mL Infiltration Given 4/8/21 1124)   tetanus-diphtheria-acellular pertussis (BOOSTRIX) IM injection 0 5 mL (0 5 mL Intramuscular Given 4/8/21 1125)       Diagnostic Studies  Results Reviewed     None                 XR finger second digit-index LEFT    (Results Pending)              Procedures  Laceration repair    Date/Time: 4/8/2021 12:35 PM  Performed by: Alba Casanova PA-C  Authorized by: Alba Casanova PA-C   Consent: The procedure was performed in an emergent situation  Body area: upper extremity  Location details: left index finger  Laceration length: 2 5 cm  Contamination: The wound is contaminated  Foreign bodies: no foreign bodies  Tendon involvement: none  Nerve involvement: none  Vascular damage: no  Anesthesia: local infiltration    Anesthesia:  Local Anesthetic: lidocaine 1% with epinephrine    Sedation:  Patient sedated: no      Wound Dehiscence:    Secondary closure or dehiscence: complex    Procedure Details:  Preparation: Patient was prepped and draped in the usual sterile fashion  Irrigation solution: tap water  Irrigation method: tap  Amount of cleaning: extensive  Debridement: minimal  Degree of undermining: minimal  Skin closure: 4-0 nylon  Number of sutures: 4  Technique: simple  Approximation: close  Approximation difficulty: complex  Dressing: 4x4 sterile gauze and antibiotic ointment  Patient tolerance: patient tolerated the procedure well with no immediate complications               ED Course                             SBIRT 20yo+      Most Recent Value   SBIRT (22 yo +)   In order to provide better care to our patients, we are screening all of our patients for alcohol and drug use  Would it be okay to ask you these screening questions?   No Filed at: 04/08/2021 1106                    [facility]  Number of Diagnoses or Management Options  Laceration of left index finger without foreign body without damage to nail, initial encounter: new and requires workup  Diagnosis management comments: Xray images left index finger independently visualized and interpreted by me - no acute fracture or dislocation  Amount and/or Complexity of Data Reviewed  Tests in the radiology section of CPT®: ordered and reviewed  Discussion of test results with the performing providers: yes  Obtain history from someone other than the patient: yes (Spouse)  Review and summarize past medical records: yes  Independent visualization of images, tracings, or specimens: yes    Risk of Complications, Morbidity, and/or Mortality  General comments: X-ray images negative for fracture  No foreign body  Wound explored to depth  No foreign body present  No injury to underlying neurovascular or orthopedic structures identified  Wound irrigated  Anesthetized and closed with 4, 4-0 nylon sutures  Will treat with Keflex  Discussed care a sutured wound  Return for suture removal in 10-12 days  Monitor wound for signs or symptoms of infection  Follow-up with primary care physician and hand specialist in 3-5 days or sooner if needed  Reviewed reasons to return to ed  Patient verbalized understanding of diagnosis and agreement with discharge plan of care as well as understanding of reasons to return to ed       I have reasonably determine that electronically prescribing a controlled substance would be impractical for the patient to obtain the controlled substance prescribed by electronic prescription or would cause an untimely delay resulting in an adverse impact on the patient's medical condition        Patient was seen during the outbreak of the corona virus epidemic   Resources are limited due to the severity of patient illnesses associated with virus   Testing is also limited at this time   Discussed with patient at the time of this evaluation   Due to the fact that limited resources are available -treatment options are limited  Patient Progress  Patient progress: stable      Disposition  Final diagnoses:   Laceration of left index finger without foreign body without damage to nail, initial encounter     Time reflects when diagnosis was documented in both MDM as applicable and the Disposition within this note     Time User Action Codes Description Comment    4/8/2021 12:32 PM Daniel Dover Add [[id number]] Laceration of left index finger without foreign body without damage to nail, initial encounter       ED Disposition     ED Disposition Condition Date/Time Comment    Discharge Stable Thu Apr 8, 2021 12:32 PM Avril Serrano discharge to home/self care              Follow-up Information     Follow up With Specialties Details Why Contact Info Additional Information    Jose Rocha DO Family Medicine Call in 1 day for further evaluation of symptoms [address] [facility] [address] Emergency Department Emergency Medicine Go to  If symptoms worsen [address] [facility] Emergency Department, [address] [place] [address], [address], MD Orthopedic Surgery, Hand Surgery, Orthopedics Call  follow up as needed for any issues with finger laceration/hand surgery [address] [place] [address]  [address]  [facility] [address]  [phone]             Discharge Medication List as of 4/8/2021 12:34 PM      START taking these medications    Details   cephalexin (KEFLEX) 500 mg capsule Take 1 capsule (500 mg total) by mouth 3 (three) times a day for 10 days, Starting Thu 4/8/2021, Until Sun 4/18/2021, Normal         CONTINUE these medications which have NOT CHANGED    Details   Xarelto 20 MG tablet Starting Fri 1/29/2021, Historical Med acetaminophen (TYLENOL) 500 mg tablet Take 1 500mg tablet in the morning prior to surgery, then 1 tablet every 6 hours for 5-7 days  , Normal      ARIPiprazole (ABILIFY) 5 mg tablet Starting Mon 2/22/2021, Historical Med      buPROPion (WELLBUTRIN XL) 300 mg 24 hr tablet Starting Sat 1/30/2021, Historical Med      Calcium Carbonate-Vitamin D 600-400 MG-UNIT per tablet Take 2 tablets by mouth, Historical Med      cyclobenzaprine (FLEXERIL) 10 mg tablet Starting Tue 2/2/2021, Historical Med      famotidine (PEPCID) 20 mg tablet Take 20 mg by mouth, Historical Med      fentaNYL (DURAGESIC) 25 mcg/hr Place 1 patch on the skin every third day, Historical Med      FLUoxetine (PROzac) 40 MG capsule Take 40 mg by mouth, Historical Med      gabapentin (NEURONTIN) 600 MG tablet Take 1,200 mg by mouth 3 (three) times a day, Historical Med      metoprolol succinate (TOPROL-XL) 25 mg 24 hr tablet Starting Fri 1/29/2021, Historical Med      MULTIPLE VITAMIN PO Take by mouth, Historical Med      oxyCODONE (OxyCONTIN) 20 mg 12 hr tablet Take 20 mg by mouth every 12 (twelve) hours, Historical Med      QUEtiapine (SEROquel) 50 mg tablet Take 50 mg by mouth, Historical Med      zolpidem (AMBIEN CR) 12 5 MG CR tablet Starting Mon 2/1/2021, Historical Med           No discharge procedures on file      PDMP Review     None          ED Provider  Electronically Signed by           Shadi Jiménez PA-C  04/08/21 5093

## 2021-04-09 ENCOUNTER — VBI (OUTPATIENT)
Dept: INTERNAL MEDICINE CLINIC | Facility: CLINIC | Age: 66
End: 2021-04-09

## 2021-04-09 NOTE — TELEPHONE ENCOUNTER
Vic Galloway    ED Visit Information     Ed visit date: 4-8-2021  Diagnosis Description: Laceration of left index finger without foreign body without damage to nail, initial encounter  In Network? Yes Mitch Fitting  Discharge status: Home  Discharged with meds ? Yes  Number of ED visits to date: 2  ED Severity:4     Outreach Information    Outreach successful: Yes 1  Date letter mailed:n/a  Date Finalized: 4-9-2021    Care Coordination    Follow up appointment with pcp: no , Message sent to clinical [place] for FU suture removal appt  Transportation issues ? No    Value [employer] type: 3 Day Outreach  Emergent necessity warranted by diagnosis: No  [facility] PCP: Yes  Transportation: Friend/Family Transport  Called PCP first?: No  Practice Contacted Patient ?: No  Pt had ED follow up with pcp/staff ?: No    Reason Patient went to ED instead of Urgent Care or PCP?: Perceived Severity of Illness  Urgent care Education?: Yes  04/09/2021 12:15 PM Phone (VBI) Aurelio Evans (Self) [phone] (M) Remove  Call Complete    By Olga Mccabe MA      Personal communication with patient regarding recent ED visit on 4-8-2021  Patient stated that she is 'a little broken' (due to injury)  Patient stated the swelling has gone down and her finger is not cold  Patient stated there is no steady pain, but if touched accidently pain is 2 of 10  Patient stated she is now uing fabric bandages   Patient would like PCP follow-up at this time  Patient does not meet OPCM criteria  Patient is aware of PCP after hour's on-call service, education not given  Patient was aware of nearest [facility] urgent care facility, education not given  Patient stated her  was insistent on going to ER vs PCP/UC  Patient stated she had a great experience in ER but could not answer questions for PCP due to being a new patient  Patient reported PCP very attentive and has no complaints      04/09/2021 12:44 PM Phone (VBI) Ledy of [address] [place] [address] of [address] [facility] (PCP) [phone] Remove  Call Complete - She will call the patient to schedule for ED FU    By Shira Barhtolomew MA

## 2021-04-14 ENCOUNTER — HOSPITAL ENCOUNTER (OUTPATIENT)
Dept: NON INVASIVE DIAGNOSTICS | Facility: CLINIC | Age: 66
Discharge: HOME | End: 2021-04-14
Payer: COMMERCIAL

## 2021-04-14 DIAGNOSIS — R06.02 SOB (SHORTNESS OF BREATH): ICD-10-CM

## 2021-04-14 PROCEDURE — 93306 TTE W/DOPPLER COMPLETE: CPT | Performed by: INTERNAL MEDICINE

## 2021-04-14 PROCEDURE — 93306 TTE W/DOPPLER COMPLETE: CPT

## 2021-04-15 ENCOUNTER — OFFICE VISIT (OUTPATIENT)
Dept: OCCUPATIONAL THERAPY | Facility: CLINIC | Age: 66
End: 2021-04-15
Payer: COMMERCIAL

## 2021-04-15 ENCOUNTER — TELEPHONE (OUTPATIENT)
Dept: INTERNAL MEDICINE CLINIC | Facility: CLINIC | Age: 66
End: 2021-04-15

## 2021-04-15 DIAGNOSIS — S52.552D OTHER CLOSED EXTRA-ARTICULAR FRACTURE OF DISTAL END OF LEFT RADIUS WITH ROUTINE HEALING, SUBSEQUENT ENCOUNTER: Primary | ICD-10-CM

## 2021-04-15 PROCEDURE — 97110 THERAPEUTIC EXERCISES: CPT | Performed by: OCCUPATIONAL THERAPIST

## 2021-04-15 PROCEDURE — 97140 MANUAL THERAPY 1/> REGIONS: CPT | Performed by: OCCUPATIONAL THERAPIST

## 2021-04-15 NOTE — PROGRESS NOTES
OT Re-Evaluation     Today's date: 4/15/2021  Patient name: Luiz Cronin  : 1955  MRN: [id number]  Referring provider: Janessa Quintero  Dx: Left DRF ORIF               Assessment  Assessment details: IE 3/17/21  Mike Silvestre is a 64 y/o right handed female who reports falling at home  She reports falling during the night while going to the bathroom  DOI 3/9/21 with urgent care same day  Referred to orthopedic and DOS for distal radius ORIF on 3/12/21  Examination reveals decreased AROM, mild to moderate pain, mild edema, no sensory changes  Strength not assessed due to early post op status  Evaluation is of low complexity, due to minimal comorbidities and stable clinical presentation Pt demonstrates good tolerance of therapy today and was provided with a written HEP focusing on suture care, orthotic wear, and gentle AROM exercises for the FA, wrist, and digits    Pt was instructed to perform HEP daily as tolerated  Pt will benefit from skilled OT intervention to progress motion, strength, function,  and allow return to PLOF  RE  4/15/21  Mike Silvestre has attended 6 therapy visits siince her IE on 3/17/21  She has achieved full FA rotation, and significant improvements in wrist AROM  Digital AROM WNL; Strength not assessed as pt is 4 weeks, 6 days post DOS  Her pain today is moderate due to heavier housecleaning and yardwork  Edema mild  No sensory changes  Impairments: abnormal or restricted ROM, impaired physical strength, lacks appropriate home exercise program and pain with function  Other impairment: edema, healing surgical site  Functional limitations: Left hand available for light self care only due to healing fx     Symptom irritability: lowBarriers to therapy: Transportation  Understanding of Dx/Px/POC: excellent  Goals  STG 2 weeks   Increase wrist arc by at least 10 degrees in extension and flexion  MET   Increase forearm rotation by at least 10 degrees in supination and pronation  MET   Reduce edema to a minimal level MET   Reduce pain at rest to less than 2/10 MET  LTG  By discharge   Achieve functional wrist arc, >80 degrees MET   Achieve functional FA arc > 160 MET   Achieve composite flexion of all digits MET   Achieve  strength to at least 50% of the uninvolved PROGRESSING   Achieve FOTO goals established at IE  PROGRESSING    Plan  Plan details: Continue care twice weekly to address motion and strength deficits  STG met, most LTG met  Patient would benefit from: skilled occupational therapy  Planned modality interventions: thermotherapy: hydrocollator packs  Planned therapy interventions: therapeutic activities, therapeutic exercise, stretching, strengthening, patient education, orthotic management and training, manual therapy, joint mobilization and home exercise program  Other planned therapy interventions: Per protocol for healing fracture     Frequency: 2x week  Duration in weeks: 4  Plan of Care beginning date: 4/15/2021  Plan of Care expiration date: 2021  Treatment plan discussed with: patient        Subjective Evaluation    History of Present Illness  Date of surgery: 3/12/2021  Mechanism of injury: surgery and trauma  Mechanism of injury: Fall at home,  3/9/2021,  DOS  3/12/2021          Not a recurrent problem   Quality of life: good    Pain  Current pain ratin  Quality: tight, pulling, pressure and sharp  Relieving factors: rest, change in position and heat  Aggravating factors: lifting    Social Support  Lives with: spouse    Employment status: not working  Hand dominance: right      Diagnostic Tests  X-ray: abnormal  Treatments  Previous treatment: occupational therapy  Current treatment: occupational therapy  Patient Goals  Patient goals for therapy: decreased edema, decreased pain, increased motion, increased strength and independence with ADLs/IADLs  Patient goal: Use left hand normally        Objective     Observations     Left Wrist/Hand Positive for edema  Additional Observation Details  Edema localized at wrist level  Neurological Testing     Sensation     Wrist/Hand   Left   Intact: light touch    Additional Neurological Details  No sensory changes reported    Active Range of Motion     Left Elbow   Forearm supination: 90 (was 30) degrees   Forearm pronation: 90 (was 80) degrees     Left Wrist   Wrist flexion: 52 (was 35) degrees   Wrist extension: 68 (was 47) degrees     Right Wrist   Wrist flexion: 75 degrees   Wrist extension: 68 degrees     Swelling     Left Wrist/Hand   Circumference MCP: 16 7 cm  Circumference wrist: 17 1 (was 19 8) cm    Right Wrist/Hand   Circumference MCP: 16 cm  Circumference wrist: 15 5 cm    Precautions:  Universal       Manuals 3/17 3/23 3/25 4/1 4/7 4/15       Edema care  10'           Scar care  NV 5' 5' 5' 5'       Kinesiotaping      5' 5' 5' 5'       Joint mobs     5'  thumb        Neuro Re-Ed                                                                                                        Ther Ex 15'   15'  10'   10'   15'   25       HEP Ortho fit/train  AROM ex HEP reviewedema splint fit HEP post injection; pain; ROM  Review to avoid trigger  5' CMC pain  Splint  Tape, protect  5' Add  Wrist E/F  HEP   15'       AAROM FA, wrist, hand    10' 10' 10'                                                                                     Ther Activity                                       Gait Training                                       Modalities                                RE

## 2021-04-15 NOTE — TELEPHONE ENCOUNTER
----- Message from Raman Rowe DO sent at 4/15/2021 12:48 PM EDT -----  Echocardiogram ok  Didn't show any functional abnormalities

## 2021-04-19 ENCOUNTER — OFFICE VISIT (OUTPATIENT)
Dept: INTERNAL MEDICINE CLINIC | Facility: CLINIC | Age: 66
End: 2021-04-19
Payer: COMMERCIAL

## 2021-04-19 VITALS
BODY MASS INDEX: 35.87 KG/M2 | WEIGHT: 190 LBS | HEIGHT: 61 IN | TEMPERATURE: 97 F | OXYGEN SATURATION: 98 % | DIASTOLIC BLOOD PRESSURE: 78 MMHG | HEART RATE: 74 BPM | SYSTOLIC BLOOD PRESSURE: 120 MMHG

## 2021-04-19 DIAGNOSIS — I26.99 PE (PULMONARY THROMBOEMBOLISM) (HCC): ICD-10-CM

## 2021-04-19 DIAGNOSIS — I10 ESSENTIAL HYPERTENSION: ICD-10-CM

## 2021-04-19 DIAGNOSIS — Z48.02 ENCOUNTER FOR REMOVAL OF SUTURES: Primary | ICD-10-CM

## 2021-04-19 DIAGNOSIS — S52.552A OTHER CLOSED EXTRA-ARTICULAR FRACTURE OF DISTAL END OF LEFT RADIUS, INITIAL ENCOUNTER: ICD-10-CM

## 2021-04-19 PROCEDURE — 3074F SYST BP LT 130 MM HG: CPT | Performed by: FAMILY MEDICINE

## 2021-04-19 PROCEDURE — S0630 REMOVAL OF SUTURES: HCPCS | Performed by: FAMILY MEDICINE

## 2021-04-19 PROCEDURE — 1036F TOBACCO NON-USER: CPT | Performed by: FAMILY MEDICINE

## 2021-04-19 PROCEDURE — 3078F DIAST BP <80 MM HG: CPT | Performed by: FAMILY MEDICINE

## 2021-04-19 PROCEDURE — 3008F BODY MASS INDEX DOCD: CPT | Performed by: FAMILY MEDICINE

## 2021-04-19 PROCEDURE — 99214 OFFICE O/P EST MOD 30 MIN: CPT | Performed by: FAMILY MEDICINE

## 2021-04-19 RX ORDER — METOPROLOL SUCCINATE 25 MG/1
25 TABLET, EXTENDED RELEASE ORAL DAILY
Qty: 30 TABLET | Refills: 1 | Status: SHIPPED | OUTPATIENT
Start: 2021-04-19 | End: 2021-05-24

## 2021-04-19 RX ORDER — RIVAROXABAN 20 MG/1
20 TABLET, FILM COATED ORAL
Qty: 30 TABLET | Refills: 1 | Status: SHIPPED | OUTPATIENT
Start: 2021-04-19 | End: 2021-06-08 | Stop reason: SDUPTHER

## 2021-04-19 NOTE — PROGRESS NOTES
FOLLOW-UP OFFICE VISIT  [facility] LISANDRA GOMEZ [facility]    NAME: Nasim Maldonado  AGE: 61 y o  SEX: female  : 1955     DATE: 2021     Assessment and Plan:     Problem List Items Addressed This Visit        Cardiovascular and Mediastinum    Essential hypertension    Relevant Medications    metoprolol succinate (TOPROL-XL) 25 mg 24 hr tablet      Other Visit Diagnoses     Encounter for removal of sutures    -  Primary    Relevant Orders    Suture removal    Other closed extra-articular fracture of distal end of left radius, initial encounter        Relevant Orders    DXA bone density spine hip and pelvis    PE (pulmonary thromboembolism) (HCC)        Relevant Medications    Xarelto 20 MG tablet      Plan: sutures removed successfully  Refills provided  Discussed risk and benefits of DEXA  Pt amendable to study           Return if symptoms worsen or fail to improve  Chief Complaint:     Chief Complaint   Patient presents with    suture removal        History of Present Illness:   60 yo female present for surture refmale  preswtned to ed on 2021 for a lac to the index finger on the left hand  4 sutures were placed at that time  Suture removal    Date/Time: 2021 9:46 AM  Performed by: Tu Salas DO  Authorized by: Tu Salas DO   Universal Protocol:  Consent: Verbal consent obtained    Risks and benefits: risks, benefits and alternatives were discussed  Consent given by: patient  Timeout called at: 2021 9:46 AM   Patient understanding: patient states understanding of the procedure being performed  Patient consent: the patient's understanding of the procedure matches consent given  Procedure consent: procedure consent matches procedure scheduled  Relevant documents: relevant documents present and verified        Patient location:  Clinic  Location:     Laterality:  Left    Location:  Other (comment) (index finger )  Procedure details: Tools used:  Suture removal kit    Number of sutures removed:  4  Post-procedure details:     Post-removal:  Antibiotic ointment applied and Band-Aid applied    Patient tolerance of procedure: Tolerated well, no immediate complications           Review of Systems:     Review of Systems     Problem List:     Patient Active Problem List   Diagnosis    Vitamin D insufficiency    Lumbar nerve root entrapment    Chronic anticoagulation    Essential hypertension    Gastroesophageal reflux disease without esophagitis    S/P gastric bypass    Closed fracture of left distal radius and ulna    SOB (shortness of breath)        Objective:     /78 (BP Location: Left arm, Patient Position: Sitting) Comment: gfdgdfg  Pulse 74   Temp (!) 97 °F (36 1 °C) (Tympanic) Comment: gdfgdg  Ht 5' 1" (1 549 m)   Wt 86 2 kg (190 lb)   SpO2 98%   BMI 35 90 kg/m²     Physical Exam  Constitutional:       Appearance: Normal appearance  HENT:      Right Ear: External ear normal       Left Ear: External ear normal       Nose: Nose normal    Cardiovascular:      Rate and Rhythm: Normal rate  Pulmonary:      Effort: Pulmonary effort is normal    Musculoskeletal:      Left hand: She exhibits tenderness  She exhibits normal two-point discrimination and normal capillary refill  Normal sensation noted  Hands:       Comments: Second digit of left hand neurovascularly  intact    Neurological:      Mental Status: She is oriented to person, place, and time  Gait: Gait normal          Pertinent Laboratory/Diagnostic Studies:    Laboratory Results: I have personally reviewed the pertinent laboratory results/reports       Radiology/Other Diagnostic Testing Results: I have personally reviewed pertinent reports          David CANDELARIALedShel[facility]  4/19/2021 10:32 AM

## 2021-04-21 ENCOUNTER — APPOINTMENT (OUTPATIENT)
Dept: OCCUPATIONAL THERAPY | Facility: CLINIC | Age: 66
End: 2021-04-21
Payer: COMMERCIAL

## 2021-04-22 ENCOUNTER — HOSPITAL ENCOUNTER (OUTPATIENT)
Dept: MAMMOGRAPHY | Facility: CLINIC | Age: 66
Discharge: HOME | End: 2021-04-22
Payer: COMMERCIAL

## 2021-04-22 ENCOUNTER — OFFICE VISIT (OUTPATIENT)
Dept: OCCUPATIONAL THERAPY | Facility: CLINIC | Age: 66
End: 2021-04-22
Payer: COMMERCIAL

## 2021-04-22 DIAGNOSIS — S52.552A OTHER CLOSED EXTRA-ARTICULAR FRACTURE OF DISTAL END OF LEFT RADIUS, INITIAL ENCOUNTER: ICD-10-CM

## 2021-04-22 DIAGNOSIS — S52.552D OTHER CLOSED EXTRA-ARTICULAR FRACTURE OF DISTAL END OF LEFT RADIUS WITH ROUTINE HEALING, SUBSEQUENT ENCOUNTER: Primary | ICD-10-CM

## 2021-04-22 PROCEDURE — 77080 DXA BONE DENSITY AXIAL: CPT

## 2021-04-22 PROCEDURE — 97140 MANUAL THERAPY 1/> REGIONS: CPT | Performed by: OCCUPATIONAL THERAPIST

## 2021-04-22 PROCEDURE — 97110 THERAPEUTIC EXERCISES: CPT | Performed by: OCCUPATIONAL THERAPIST

## 2021-04-22 NOTE — PROGRESS NOTES
Daily Note     Today's date: 2021  Patient name: Peter Jones  : 1955  MRN: [id number]  Referring provider: Reyes Cones  Dx: Left DRF ORIF               Subjective:  "I just saw the doc, and he injected my thumb"  Thumb trigger  "My thumb still triggers"      Objective: See treatment diary below  HEP and splint wear reviewed to reduce hand edema  Scar and edema care and HEP reviewed today  Hold clinic exercises  AROM  FA S/P  80/90  Wrist E/F L   58/55  R  70/65  Digits WNL      Assessment: Tolerated treatment well  Patient would benefit from continued OT  Painful CMC joint reducing  Wrist pain is minimal   Steady increased of AROM of the FA and wrist   Trigger of thumb persists  Plan: Progress treament per protocol  Continue splint wear  Instructed pt in purchasing thumb comfort cool splint for pain of CMC joint  Next week, resume care and progress as pain allows  Reduce frequency to once weekly until NDV  Precautions:  Universal       Manuals 3/17 3/23 3/25 4/1 4/7 4/15 4/22      Edema care  10'           Scar care/  IASTM  NV 5' 5' 5' 5' 10'      Kinesiotaping  5' 5' 5' 5'       Joint mobs     5'  thumb        Neuro Re-Ed                                                                                                        Ther Ex 15'   15'  10'   10'   15'   25  15'      HEP Ortho fit/train  AROM ex HEP reviewedema splint fit HEP post injection; pain; ROM  Review to avoid trigger  5' CMC pain  Splint  Tape, protect  5' Add  Wrist E/F  HEP   15' HEP -  trigger thumb care     5'      AAROM FA, wrist, hand    10' 10' 10' 10'                                                                                    Ther Activity                                       Gait Training                                       Modalities                                RE

## 2021-04-28 ENCOUNTER — OFFICE VISIT (OUTPATIENT)
Dept: OCCUPATIONAL THERAPY | Facility: CLINIC | Age: 66
End: 2021-04-28
Payer: COMMERCIAL

## 2021-04-28 DIAGNOSIS — S52.552D OTHER CLOSED EXTRA-ARTICULAR FRACTURE OF DISTAL END OF LEFT RADIUS WITH ROUTINE HEALING, SUBSEQUENT ENCOUNTER: Primary | ICD-10-CM

## 2021-04-28 PROCEDURE — 97110 THERAPEUTIC EXERCISES: CPT | Performed by: OCCUPATIONAL THERAPIST

## 2021-04-30 ENCOUNTER — TELEPHONE (OUTPATIENT)
Dept: INTERNAL MEDICINE CLINIC | Facility: CLINIC | Age: 66
End: 2021-04-30

## 2021-04-30 DIAGNOSIS — M85.859 OSTEOPENIA OF NECK OF FEMUR, UNSPECIFIED LATERALITY: Primary | ICD-10-CM

## 2021-04-30 NOTE — TELEPHONE ENCOUNTER
----- Message from Jasbir Patel DO sent at 4/30/2021  1:05 PM EDT -----  Study shows osteopenia (low bone density)  She should start a vit D and calcium supplement if not already taking them

## 2021-04-30 NOTE — TELEPHONE ENCOUNTER
Spoke with: patient  Re:  Calcium, Vit D  Provider's message/resuts/instructions/inquiries relayed in full detal   Comments:    Pt requesting Rx for calcium and Vit D be sent to [facility] pharmacy

## 2021-05-05 ENCOUNTER — OFFICE VISIT (OUTPATIENT)
Dept: OCCUPATIONAL THERAPY | Facility: CLINIC | Age: 66
End: 2021-05-05
Payer: COMMERCIAL

## 2021-05-05 DIAGNOSIS — S52.552D OTHER CLOSED EXTRA-ARTICULAR FRACTURE OF DISTAL END OF LEFT RADIUS WITH ROUTINE HEALING, SUBSEQUENT ENCOUNTER: Primary | ICD-10-CM

## 2021-05-05 PROCEDURE — 97110 THERAPEUTIC EXERCISES: CPT | Performed by: OCCUPATIONAL THERAPIST

## 2021-05-05 NOTE — PROGRESS NOTES
OT Re-Evaluation  and OT Discharge     Today's date: 2021  Patient name: Pat Owusu  : 1955  MRN: [id number]  Referring provider: Rush Bonds PA-C  Dx: Left DRF ORIF               Assessment  Assessment details: IE 3/17/21  Ramon Green is a 64 y/o right handed female who reports falling at home  She reports falling during the night while going to the bathroom  DOI 3/9/21 with urgent care same day  Referred to orthopedic and DOS for distal radius ORIF on 3/12/21  Examination reveals decreased AROM, mild to moderate pain, mild edema, no sensory changes  Strength not assessed due to early post op status  Evaluation is of low complexity, due to minimal comorbidities and stable clinical presentation Pt demonstrates good tolerance of therapy today and was provided with a written HEP focusing on suture care, orthotic wear, and gentle AROM exercises for the FA, wrist, and digits    Pt was instructed to perform HEP daily as tolerated  Pt will benefit from skilled OT intervention to progress motion, strength, function,  and allow return to PLOF  RE  4/15/21  Ramon Green has attended 6 therapy visits siince her IE on 3/17/21  She has achieved full FA rotation, and significant improvements in wrist AROM  Digital AROM WNL; Strength not assessed as pt is 4 weeks, 6 days post DOS  Her pain today is moderate due to heavier housecleaning and yardwork  Edema mild  No sensory changes  RE 21  Ramon Green has attended 9 therapy visits since her IE on 3/17/21  She demonstrates full FA AROM, wrist arc greater than 120,  strength to 50% of the uninvolved dominant side  Reducing edema to a mild level localized at the wrist    Pain mild to moderate depending on activity  She has resumed most daily activities with the exception of heavy tasks  Mild paresthesias nightly if she does not wear orthotic for HOS     Impairments: impaired physical strength    Symptom irritability: lowBarriers to therapy: Transportation  Understanding of Dx/Px/POC: excellent  Goals  STG 2 weeks   Increase wrist arc by at least 10 degrees in extension and flexion  MET   Increase forearm rotation by at least 10 degrees in supination and pronation  MET   Reduce edema to a minimal level MET   Reduce pain at rest to less than 2/10 MET  LTG  By discharge   Achieve functional wrist arc, >80 degrees MET   Achieve functional FA arc > 160 MET   Achieve composite flexion of all digits MET   Achieve  strength to at least 50% of the uninvolved MET   Achieve FOTO goals established at IE  PROGRESSING    Plan  Plan details: Discharge to [facility] for continued stretching and strengthening program   Pt has been issued PRE program for the FA, wrist, and digits  Planned therapy interventions: home exercise program  Treatment plan discussed with: patient        Subjective Evaluation    History of Present Illness  Date of surgery: 3/12/2021  Mechanism of injury: surgery and trauma  Mechanism of injury: Fall at home,  3/9/2021,  DOS  3/12/2021          Not a recurrent problem   Quality of life: good    Pain  Current pain ratin  Quality: tight, pulling, pressure and sharp  Relieving factors: rest, change in position and heat  Aggravating factors: lifting    Social Support  Lives with: spouse    Employment status: not working  Hand dominance: right      Diagnostic Tests  X-ray: abnormal  Treatments  Previous treatment: occupational therapy  Current treatment: occupational therapy  Patient Goals  Patient goals for therapy: decreased edema, decreased pain, increased motion, increased strength and independence with ADLs/IADLs  Patient goal: Use left hand normally        Objective     Observations     Left Wrist/Hand   Positive for edema  Additional Observation Details  Edema localized at wrist level       Neurological Testing     Sensation     Wrist/Hand   Left   Intact: light touch    Additional Neurological Details  Nightly paresthesias reported  Active Range of Motion     Left Elbow   Forearm supination: 90 (was 30) degrees   Forearm pronation: 90 (was 80) degrees     Left Wrist   Wrist flexion: 60 (was 35) degrees   Wrist extension: 75 (was 47) degrees     Right Wrist   Wrist flexion: 68 degrees   Wrist extension: 75 degrees     Strength/Myotome Testing     Additional Strength Details  Juvencio #2  L  23 lbs,  R  55 lbs  Pinch NT due to trigger thumb post recent injection  Swelling     Left Wrist/Hand   Circumference MCP: 16 7 cm  Circumference wrist: 17 1 (was 19 8) cm    Right Wrist/Hand   Circumference MCP: 16 cm  Circumference wrist: 15 5 cm    Precautions:  Universal       Manuals 3/17 3/23 3/25 4/1 4/7 4/15 4/22 4/28 5/5    Edema care  10'           Scar care/  IASTM  NV 5' 5' 5' 5' 10' 10'     Kinesiotaping  5' 5' 5' 5'       Joint mobs     5'  thumb        Neuro Re-Ed                                                                                                        Ther Ex 15'   15'  10'   10'   15'   25  15'   25'   30'    HEP Ortho fit/train  AROM ex HEP reviewedema splint fit HEP post injection; pain; ROM  Review to avoid trigger  5' CMC pain  Splint  Tape, protect  5' Add  Wrist E/F  HEP   15' HEP -  trigger thumb care     5' New HEP for FA/ wrist PRE  15'' Review   All HEP  For D/c  RE for NDV  20'    AAROM FA, wrist, hand    10' 10' 10' 10' 10' 10'                                                                                  Ther Activity                                       Gait Training                                       Modalities                                RE   RE

## 2021-05-06 ENCOUNTER — APPOINTMENT (OUTPATIENT)
Dept: RADIOLOGY | Facility: CLINIC | Age: 66
End: 2021-05-06
Payer: COMMERCIAL

## 2021-05-06 ENCOUNTER — OFFICE VISIT (OUTPATIENT)
Dept: OBGYN CLINIC | Facility: CLINIC | Age: 66
End: 2021-05-06

## 2021-05-06 VITALS
BODY MASS INDEX: 35.87 KG/M2 | WEIGHT: 190 LBS | HEART RATE: 77 BPM | SYSTOLIC BLOOD PRESSURE: 113 MMHG | HEIGHT: 61 IN | DIASTOLIC BLOOD PRESSURE: 74 MMHG

## 2021-05-06 DIAGNOSIS — Z48.89 AFTERCARE FOLLOWING SURGERY: ICD-10-CM

## 2021-05-06 DIAGNOSIS — Z48.89 AFTERCARE FOLLOWING SURGERY: Primary | ICD-10-CM

## 2021-05-06 PROCEDURE — 99024 POSTOP FOLLOW-UP VISIT: CPT | Performed by: ORTHOPAEDIC SURGERY

## 2021-05-06 PROCEDURE — 3008F BODY MASS INDEX DOCD: CPT | Performed by: FAMILY MEDICINE

## 2021-05-06 PROCEDURE — 73110 X-RAY EXAM OF WRIST: CPT

## 2021-05-06 NOTE — PROGRESS NOTES
CHIEF COMPLAINT:  Chief Complaint   Patient presents with    Left Wrist - Post-op       SUBJECTIVE:  Aviva Miranda is a 61y o  year old female who presentsfor follow up after surgery, Left distal radius ORIF and EPL tendon release and hematoma evacuation performed on  3/12/2021  Pt was last in the office 3/25/2021 where she received a left [facility] that provided her with some relief  Pt states that she does have pain with therapy  Pt states that she has continued to work on motion  Pt states that she is having difficulty with weighted activities but is working on strength with HEP  PAST MEDICAL HISTORY:  Past Medical History:   Diagnosis Date    Anxiety     Depression     DVT (deep venous thrombosis) ([address] )     Hypertension        PAST SURGICAL HISTORY:  Past Surgical History:   Procedure Laterality Date     SECTION      CHOLECYSTECTOMY      COSMETIC SURGERY      GASTRIC BYPASS      VT OPEN RX DISTAL RADIUS FX, EXTRA-ARTICULAR Left 3/12/2021    Procedure: OPEN REDUCTION W/ INTERNAL FIXATION (ORIF) RADIUS Left distal radius, EPL tendon release;  Surgeon: Diamond Richardson MD;  Location: [facility];  Service: Orthopedics       FAMILY HISTORY:  History reviewed  No pertinent family history  SOCIAL HISTORY:  Social History     Tobacco Use    Smoking status: Never Smoker    Smokeless tobacco: Never Used   Substance Use Topics    Alcohol use: Not Currently     Frequency: Never    Drug use: Never       MEDICATIONS:    Current Outpatient Medications:     acetaminophen (TYLENOL) 500 mg tablet, Take 1 500mg tablet in the morning prior to surgery, then 1 tablet every 6 hours for 5-7 days  , Disp: 30 tablet, Rfl: 0    ARIPiprazole (ABILIFY) 5 mg tablet, , Disp: , Rfl:     buPROPion (WELLBUTRIN XL) 300 mg 24 hr tablet, , Disp: , Rfl:     Calcium Carbonate-Vitamin D 600-400 MG-UNIT per tablet, Take 2 tablets by mouth daily, Disp: 60 tablet, Rfl: 1    cyclobenzaprine (FLEXERIL) 10 mg tablet, , Disp: , Rfl:     famotidine (PEPCID) 20 mg tablet, Take 20 mg by mouth, Disp: , Rfl:     fentaNYL (DURAGESIC) 25 mcg/hr, Place 1 patch on the skin every third day, Disp: , Rfl:     FLUoxetine (PROzac) 40 MG capsule, Take 40 mg by mouth, Disp: , Rfl:     gabapentin (NEURONTIN) 600 MG tablet, Take 1,200 mg by mouth 3 (three) times a day, Disp: , Rfl:     metoprolol succinate (TOPROL-XL) 25 mg 24 hr tablet, Take 1 tablet (25 mg total) by mouth daily, Disp: 30 tablet, Rfl: 1    MULTIPLE VITAMIN PO, Take by mouth, Disp: , Rfl:     oxyCODONE (OxyCONTIN) 20 mg 12 hr tablet, Take 20 mg by mouth every 12 (twelve) hours, Disp: , Rfl:     QUEtiapine (SEROquel) 50 mg tablet, Take 50 mg by mouth, Disp: , Rfl:     Xarelto 20 MG tablet, Take 1 tablet (20 mg total) by mouth daily with breakfast, Disp: 30 tablet, Rfl: 1    zolpidem (AMBIEN CR) 12 5 MG CR tablet, , Disp: , Rfl:     ALLERGIES:  Allergies   Allergen Reactions    Molds & Smuts Hives     Difficulty breathing    Other Hives     Cherry, walnuts, almonds, all nuts, fresh tree fruits, pet dander    Peach Flavor - Food Allergy Hives     Fresh peaches only    Pollen Extract Hives       REVIEW OF SYSTEMS:  Review of Systems   Constitutional: Negative for chills, fever and unexpected weight change  HENT: Negative for hearing loss, nosebleeds and sore throat  Eyes: Negative for pain, redness and visual disturbance  Respiratory: Negative for cough, shortness of breath and wheezing  Cardiovascular: Negative for chest pain, palpitations and leg swelling  Gastrointestinal: Negative for abdominal pain, nausea and vomiting  Endocrine: Negative for polydipsia and polyuria  Genitourinary: Negative for dysuria and hematuria  Skin: Negative for rash and wound  Neurological: Negative for dizziness and headaches  Psychiatric/Behavioral: Negative for decreased concentration, dysphoric mood and suicidal ideas  The patient is not nervous/anxious  VITALS:  Vitals:    05/06/21 0848   BP: 113/74   Pulse: 77       LABS:  HgA1c: No results found for: HGBA1C  BMP:   Lab Results   Component Value Date    CALCIUM 8 9 02/17/2021    K 3 9 02/17/2021    CO2 28 02/17/2021     02/17/2021    BUN 12 02/17/2021    CREATININE 0 86 02/17/2021       _____________________________________________________  PHYSICAL EXAMINATION:   General: well developed and well nourished, alert, oriented times 3 and appears comfortable  Psychiatric: Normal  HEENT: Trachea Midline, No torticollis  Pulmonary: No audible wheezing or strider  Cardiovascular: No discernable arrhythmia   Skin: No masses, erythema, lacerations, fluctation, ulcerations  Neurovascular: Sensation Intact to the Median, Ulnar, Radial Nerve, Motor Intact to the Median, Ulnar, Radial Nerve and Pulses Intact    MUSCULOSKELETAL EXAMINATION:  Left wrist  Well healed dorsal and volar incisions  Limited flexion when compared to contralateral side  Good extension when compared to contralateral side   Good motion of thumb   Pain with cmc grind test     ___________________________________________________  STUDIES REVIEWED:  xrays of the left wrist performed today show distal radius in maintained stable alignment, orthopedic hardware in good position, degenerative changes at the cmc joint of the thumb        PROCEDURES PERFORMED:  Procedures  No Procedures performed today    _____________________________________________________  ASSESSMENT/PLAN:    S/P ORIF left distal radius with left EPL tendon release and hematoma evacuation 3/12/21  - Pt was advised to continue to work on motion and strengthening     S/P left CMC CSI - 3/25/2021  - Pt was advised that if her pain worsens we can consider a repeat CSI or surgical intervention         Follow Up:  Return if symptoms worsen or fail to improve          To Do Next Visit:  Re-evaluation of current issue    Scribe Attestation    I,:  Archana Tsai am acting as a scribe while in the presence of the attending physician :       I,:  Yvonne Myles MD personally performed the services described in this documentation    as scribed in my presence :

## 2021-05-17 ENCOUNTER — APPOINTMENT (OUTPATIENT)
Dept: LAB | Facility: CLINIC | Age: 66
End: 2021-05-17
Payer: COMMERCIAL

## 2021-05-17 ENCOUNTER — TELEPHONE (OUTPATIENT)
Dept: INTERNAL MEDICINE CLINIC | Facility: CLINIC | Age: 66
End: 2021-05-17

## 2021-05-17 ENCOUNTER — OFFICE VISIT (OUTPATIENT)
Dept: INTERNAL MEDICINE CLINIC | Facility: CLINIC | Age: 66
End: 2021-05-17
Payer: COMMERCIAL

## 2021-05-17 VITALS
TEMPERATURE: 96.7 F | DIASTOLIC BLOOD PRESSURE: 82 MMHG | WEIGHT: 191.2 LBS | BODY MASS INDEX: 36.13 KG/M2 | OXYGEN SATURATION: 96 % | HEART RATE: 71 BPM | SYSTOLIC BLOOD PRESSURE: 118 MMHG

## 2021-05-17 DIAGNOSIS — M54.16 LUMBAR NERVE ROOT ENTRAPMENT: ICD-10-CM

## 2021-05-17 DIAGNOSIS — R63.5 WEIGHT GAIN: ICD-10-CM

## 2021-05-17 DIAGNOSIS — Z12.31 ENCOUNTER FOR SCREENING MAMMOGRAM FOR BREAST CANCER: ICD-10-CM

## 2021-05-17 DIAGNOSIS — G89.4 CHRONIC PAIN DISORDER: ICD-10-CM

## 2021-05-17 DIAGNOSIS — Z00.00 ANNUAL PHYSICAL EXAM: Primary | ICD-10-CM

## 2021-05-17 DIAGNOSIS — I10 ESSENTIAL HYPERTENSION: ICD-10-CM

## 2021-05-17 PROBLEM — R56.9 SEIZURE (HCC): Status: ACTIVE | Noted: 2021-05-17

## 2021-05-17 PROBLEM — R56.9 SEIZURE (HCC): Status: RESOLVED | Noted: 2021-05-17 | Resolved: 2021-05-17

## 2021-05-17 LAB — TSH SERPL DL<=0.05 MIU/L-ACNC: 3.44 UIU/ML (ref 0.36–3.74)

## 2021-05-17 PROCEDURE — 84443 ASSAY THYROID STIM HORMONE: CPT

## 2021-05-17 PROCEDURE — 1036F TOBACCO NON-USER: CPT | Performed by: FAMILY MEDICINE

## 2021-05-17 PROCEDURE — 3725F SCREEN DEPRESSION PERFORMED: CPT | Performed by: FAMILY MEDICINE

## 2021-05-17 PROCEDURE — 3079F DIAST BP 80-89 MM HG: CPT | Performed by: FAMILY MEDICINE

## 2021-05-17 PROCEDURE — 99396 PREV VISIT EST AGE 40-64: CPT | Performed by: FAMILY MEDICINE

## 2021-05-17 PROCEDURE — 3074F SYST BP LT 130 MM HG: CPT | Performed by: FAMILY MEDICINE

## 2021-05-17 PROCEDURE — 36415 COLL VENOUS BLD VENIPUNCTURE: CPT

## 2021-05-17 RX ORDER — GABAPENTIN 600 MG/1
1200 TABLET ORAL 3 TIMES DAILY
Qty: 180 TABLET | Refills: 0 | Status: SHIPPED | OUTPATIENT
Start: 2021-05-17 | End: 2021-06-08 | Stop reason: SDUPTHER

## 2021-05-17 NOTE — PATIENT INSTRUCTIONS

## 2021-05-17 NOTE — TELEPHONE ENCOUNTER
----- Message from Edward Muñiz DO sent at 5/17/2021  2:59 PM EDT -----  Please notify pt of normal result

## 2021-05-17 NOTE — PROGRESS NOTES
ADULT ANNUAL PHYSICAL   [address]    NAME: Aviva Miranda  AGE: 61 y o  SEX: female  : 1955     DATE: 2021     Assessment and Plan:     Problem List Items Addressed This Visit        Cardiovascular and Mediastinum    Essential hypertension       Nervous and Auditory    Lumbar nerve root entrapment    Relevant Medications    gabapentin (NEURONTIN) 600 MG tablet       Other    Chronic pain disorder      Other Visit Diagnoses     Annual physical exam    -  Primary    Weight gain        Relevant Orders    TSH, 3rd generation with Free T4 reflex    Encounter for screening mammogram for breast cancer        Relevant Orders    Mammo screening bilateral w 3d & cad        Plan; well adult with chronic pain  [place]  Needs to transfer to [place] provider  Will call insurance company and she who is covered under her insurance  Weight gain likley due to dietary changes  Pt encouraged to decrease simple carbohydrates and processed food consumption  Will check thyroid as well  Refill of gabapentin given  Counseling:  · Exercise: the importance of regular exercise/physical activity was discussed  Recommend exercise 3-5 times per week for at least 30 minutes  BMI Counseling: Body mass index is 36 13 kg/m²  The BMI is above normal  Nutrition recommendations include decreasing portion sizes, encouraging healthy choices of fruits and vegetables, decreasing fast food intake, consuming healthier snacks and limiting drinks that contain sugar  Return in about 6 months (around 2021) for Next scheduled follow up  Chief Complaint:     Chief Complaint   Patient presents with    Medication Refill     "pain management"      History of Present Illness:     Adult Annual Physical   Patient here for a comprehensive physical exam  The patient reports needing refill on medication  Has surgical history of the back  Chronic pain  Pain medicine provider is in [place]  Last seen a month ago  Its almost a 2 hour drive  Needs to find a new specialist  Will have a change in insurance soon  needs refills  Pt on fentyl patch, gabapentin, OxyContin  and flexeril  C/o weight gain  Admits to eating more refined foods  Snacking  Diet and Physical Activity  · Diet/Nutrition: frequent junk food and limited fruits/vegetables  · Exercise: no formal exercise  Depression Screening  PHQ-9 Depression Screening    PHQ-9:   Frequency of the following problems over the past two weeks:      Little interest or pleasure in doing things: 0 - not at all  Feeling down, depressed, or hopeless: 1 - several days  PHQ-2 Score: 1       General Health  · Sleep: gets 7-8 hours of sleep on average  · Hearing: normal - bilateral   · Vision: goes for regular eye exams and wears glasses  · Dental: regular dental visits  /GYN Health  · Patient is: postmenopausal  · Last mammo: a year ago    · Last colo- 3 years ago   Review of Systems:     Review of Systems   Constitutional: Positive for unexpected weight change  Respiratory: Negative for shortness of breath  Cardiovascular: Negative for chest pain  Musculoskeletal: Negative for gait problem  Psychiatric/Behavioral: Positive for dysphoric mood and sleep disturbance  The patient is nervous/anxious         Past Medical History:     Past Medical History:   Diagnosis Date    Anxiety     Depression     DVT (deep venous thrombosis) (HCC)     Hypertension       Past Surgical History:     Past Surgical History:   Procedure Laterality Date     SECTION      CHOLECYSTECTOMY      COSMETIC SURGERY      GASTRIC BYPASS      [place] OPEN RX DISTAL RADIUS FX, EXTRA-ARTICULAR Left 3/12/2021    Procedure: OPEN REDUCTION W/ INTERNAL FIXATION (ORIF) RADIUS Left distal radius, EPL tendon release;  Surgeon: Yvonne Myles MD;  Location: [facility];  Service: Orthopedics      Social History: E-Cigarette/Vaping    E-Cigarette Use Never User      E-Cigarette/Vaping Substances    Nicotine No     THC No     CBD No     Flavoring No     Other No     Unknown No      Social History     Socioeconomic History    Marital status: /Civil Union     Spouse name: None    Number of children: None    Years of education: None    Highest education level: None   Occupational History    None   Social Needs    Financial resource strain: None    Food insecurity     Worry: None     Inability: None    Transportation needs     Medical: None     Non-medical: None   Tobacco Use    Smoking status: Never Smoker    Smokeless tobacco: Never Used   Substance and Sexual Activity    Alcohol use: Not Currently     Frequency: Monthly or less     Drinks per session: 1 or 2     Binge frequency: Less than monthly    Drug use: Never    Sexual activity: None   Lifestyle    Physical activity     Days per week: None     Minutes per session: None    Stress: None   Relationships    Social connections     Talks on phone: None     Gets together: None     Attends Baptism service: None     Active member of club or organization: None     Attends meetings of clubs or organizations: None     Relationship status: None    Intimate partner violence     Fear of current or ex partner: None     Emotionally abused: None     Physically abused: None     Forced sexual activity: None   Other Topics Concern    None   Social History Narrative    None      Family History:     History reviewed  No pertinent family history     Current Medications:     Current Outpatient Medications   Medication Sig Dispense Refill    ARIPiprazole (ABILIFY) 5 mg tablet       buPROPion (WELLBUTRIN XL) 300 mg 24 hr tablet       Calcium Carbonate-Vitamin D 600-400 MG-UNIT per tablet Take 2 tablets by mouth daily 60 tablet 1    cyclobenzaprine (FLEXERIL) 10 mg tablet       famotidine (PEPCID) 20 mg tablet Take 20 mg by mouth      fentaNYL (DURAGESIC) 25 mcg/hr Place 1 patch on the skin every third day      FLUoxetine (PROzac) 40 MG capsule Take 40 mg by mouth      gabapentin (NEURONTIN) 600 MG tablet Take 2 tablets (1,200 mg total) by mouth 3 (three) times a day 180 tablet 0    metoprolol succinate (TOPROL-XL) 25 mg 24 hr tablet Take 1 tablet (25 mg total) by mouth daily 30 tablet 1    MULTIPLE VITAMIN PO Take by mouth      oxyCODONE (OxyCONTIN) 20 mg 12 hr tablet Take 20 mg by mouth every 12 (twelve) hours      Xarelto 20 MG tablet Take 1 tablet (20 mg total) by mouth daily with breakfast 30 tablet 1    zolpidem (AMBIEN CR) 12 5 MG CR tablet       acetaminophen (TYLENOL) 500 mg tablet Take 1 500mg tablet in the morning prior to surgery, then 1 tablet every 6 hours for 5-7 days  (Patient not taking: Reported on 5/17/2021) 30 tablet 0    QUEtiapine (SEROquel) 50 mg tablet Take 50 mg by mouth       No current facility-administered medications for this visit  Allergies: Allergies   Allergen Reactions    Molds & Smuts Hives     Difficulty breathing    Other Hives     Cherry, walnuts, almonds, all nuts, fresh tree fruits, pet dander    Peach Flavor - Food Allergy Hives     Fresh peaches only    Pollen Extract Hives      Physical Exam:     /82 (BP Location: Left arm, Patient Position: Sitting)   Pulse 71   Temp (!) 96 7 °F (35 9 °C) (Temporal)   Wt 86 7 kg (191 lb 3 2 oz)   SpO2 96%   BMI 36 13 kg/m²     Physical Exam  HENT:      Head: Normocephalic and atraumatic  Right Ear: External ear normal       Left Ear: External ear normal       Nose: Nose normal       Mouth/Throat:      Mouth: Mucous membranes are moist    Eyes:      Conjunctiva/sclera: Conjunctivae normal    Cardiovascular:      Rate and Rhythm: Normal rate and regular rhythm  Heart sounds: No murmur  Pulmonary:      Effort: Pulmonary effort is normal       Breath sounds: Normal breath sounds     Abdominal:      General: Bowel sounds are normal       Palpations: Abdomen is soft  Tenderness: There is no abdominal tenderness  Musculoskeletal:      Right lower leg: No edema  Left lower leg: No edema  Neurological:      Mental Status: She is alert and oriented to person, place, and time        Gait: Gait normal    Psychiatric:         Mood and Affect: Mood normal          Behavior: Behavior normal           Juan Daniel Farah, DO  MEDICAL [id number]

## 2021-05-18 ENCOUNTER — TELEPHONE (OUTPATIENT)
Dept: INTERNAL MEDICINE CLINIC | Facility: CLINIC | Age: 66
End: 2021-05-18

## 2021-05-18 NOTE — TELEPHONE ENCOUNTER
----- Message from Jose Rocha DO sent at 5/17/2021 12:14 PM EDT -----  Please notify pt that since her last refill of oxycodone was 4/27/2021 the next can't be ordered until closer to the 27th of this month     -Dr Ebony Velazquez

## 2021-05-22 DIAGNOSIS — I10 ESSENTIAL HYPERTENSION: ICD-10-CM

## 2021-05-24 RX ORDER — METOPROLOL SUCCINATE 25 MG/1
TABLET, EXTENDED RELEASE ORAL
Qty: 30 TABLET | Refills: 1 | Status: SHIPPED | OUTPATIENT
Start: 2021-05-24 | End: 2021-06-14

## 2021-05-28 ENCOUNTER — TELEPHONE (OUTPATIENT)
Dept: INTERNAL MEDICINE CLINIC | Facility: CLINIC | Age: 66
End: 2021-05-28

## 2021-05-28 DIAGNOSIS — G89.4 CHRONIC PAIN DISORDER: Primary | ICD-10-CM

## 2021-05-28 RX ORDER — OXYCODONE HCL 20 MG/1
20 TABLET, FILM COATED, EXTENDED RELEASE ORAL EVERY 12 HOURS SCHEDULED
Qty: 120 TABLET | Refills: 0 | Status: SHIPPED | OUTPATIENT
Start: 2021-05-28 | End: 2021-06-01

## 2021-05-28 NOTE — TELEPHONE ENCOUNTER
Pharmacy says previous script on file is for Oxycodone 20 mg, 1 tablet every 6 hours  Confirm for the patient if script is now to be for the extended release, 1 tablet every 12 hours

## 2021-05-29 NOTE — TELEPHONE ENCOUNTER
# [id number]    Patient does not want extended release    oxyCODONE (OxyCONTIN) 20 mg 12 hr tablet      [facility] # [phone]

## 2021-06-01 RX ORDER — OXYCODONE HYDROCHLORIDE 20 MG/1
20 TABLET ORAL EVERY 6 HOURS PRN
Qty: 120 TABLET | Refills: 0 | Status: SHIPPED | OUTPATIENT
Start: 2021-06-01 | End: 2021-06-28

## 2021-06-07 NOTE — TELEPHONE ENCOUNTER
This request has been approved    Patient Key is: [id number] - PA Case ID: [id number] - Rx #: [id number]  [employer] Medicare Electronic PA Form (2017 NCPDP)  Called the patients pharmacy to notify them that the patient is covered for the Oxycodone (Roxicodone) 20 MG Tablets

## 2021-06-08 DIAGNOSIS — M85.859 OSTEOPENIA OF NECK OF FEMUR, UNSPECIFIED LATERALITY: ICD-10-CM

## 2021-06-08 DIAGNOSIS — K21.9 GASTROESOPHAGEAL REFLUX DISEASE WITHOUT ESOPHAGITIS: Primary | ICD-10-CM

## 2021-06-08 DIAGNOSIS — M54.16 LUMBAR NERVE ROOT ENTRAPMENT: ICD-10-CM

## 2021-06-08 DIAGNOSIS — I26.99 PE (PULMONARY THROMBOEMBOLISM) (HCC): ICD-10-CM

## 2021-06-08 RX ORDER — CYCLOBENZAPRINE HCL 10 MG
10 TABLET ORAL 3 TIMES DAILY PRN
Qty: 90 TABLET | Refills: 0 | Status: SHIPPED | OUTPATIENT
Start: 2021-06-08 | End: 2021-06-11

## 2021-06-08 RX ORDER — RIVAROXABAN 20 MG/1
20 TABLET, FILM COATED ORAL
Qty: 90 TABLET | Refills: 1 | Status: SHIPPED | OUTPATIENT
Start: 2021-06-08 | End: 2022-01-02

## 2021-06-08 RX ORDER — FAMOTIDINE 20 MG/1
20 TABLET, FILM COATED ORAL DAILY
Qty: 90 TABLET | Refills: 1 | Status: SHIPPED | OUTPATIENT
Start: 2021-06-08 | End: 2021-09-20 | Stop reason: SDUPTHER

## 2021-06-08 RX ORDER — GABAPENTIN 600 MG/1
1200 TABLET ORAL 3 TIMES DAILY
Qty: 180 TABLET | Refills: 0 | Status: SHIPPED | OUTPATIENT
Start: 2021-06-08 | End: 2022-02-28 | Stop reason: SDUPTHER

## 2021-06-08 NOTE — TELEPHONE ENCOUNTER
Refill on:  Calcium Carbonate-Vitamin D 600-400 MG-UNIT per tablet    cyclobenzaprine (FLEXERIL) 10 mg tablet    famotidine (PEPCID) 20 mg tablet    gabapentin (NEURONTIN) 600 MG tablet  Medication    Xarelto 20 MG tablet    [facility] order

## 2021-06-11 DIAGNOSIS — M85.859 OSTEOPENIA OF NECK OF FEMUR, UNSPECIFIED LATERALITY: ICD-10-CM

## 2021-06-11 DIAGNOSIS — I10 ESSENTIAL HYPERTENSION: ICD-10-CM

## 2021-06-11 DIAGNOSIS — M54.16 LUMBAR NERVE ROOT ENTRAPMENT: ICD-10-CM

## 2021-06-11 RX ORDER — CYCLOBENZAPRINE HCL 10 MG
TABLET ORAL
Qty: 90 TABLET | Refills: 10 | Status: SHIPPED | OUTPATIENT
Start: 2021-06-11 | End: 2021-09-20 | Stop reason: SDUPTHER

## 2021-06-14 RX ORDER — METOPROLOL SUCCINATE 25 MG/1
TABLET, EXTENDED RELEASE ORAL
Qty: 90 TABLET | Refills: 1 | Status: SHIPPED | OUTPATIENT
Start: 2021-06-14 | End: 2021-12-09

## 2021-06-27 ENCOUNTER — TELEPHONE (OUTPATIENT)
Dept: OTHER | Facility: OTHER | Age: 66
End: 2021-06-27

## 2021-06-27 DIAGNOSIS — F51.04 PSYCHOPHYSIOLOGICAL INSOMNIA: Primary | ICD-10-CM

## 2021-06-27 DIAGNOSIS — G89.4 CHRONIC PAIN DISORDER: ICD-10-CM

## 2021-06-27 NOTE — TELEPHONE ENCOUNTER
Pt calling because she has been calling the pharmacy because she needs refills of her oxycodone and zolpidem  The pharmacist keeps telling her is is taking care of it for the past week now and that he is going to call the office, but pt thinks he is not even calling the office  Informed pt that controlled substances can not be refilled on weekends, but that I will place a note in her chart and send it to office staff so they can assist her with this on Monday morning  Please f/u with pt  Thank you!

## 2021-06-28 ENCOUNTER — TELEPHONE (OUTPATIENT)
Dept: INTERNAL MEDICINE CLINIC | Facility: CLINIC | Age: 66
End: 2021-06-28

## 2021-06-28 RX ORDER — OXYCODONE HYDROCHLORIDE 20 MG/1
TABLET ORAL
Qty: 120 TABLET | Refills: 0 | Status: SHIPPED | OUTPATIENT
Start: 2021-06-28 | End: 2021-07-20

## 2021-06-28 NOTE — TELEPHONE ENCOUNTER
Both oxycodone and zolpidem are already pending for  to order, pt would like a call back upon completion,     Pt is completely out of medications would like them renewed today, dr Efrain Meredith will be back tomorrow, call back upon completion

## 2021-06-29 RX ORDER — ZOLPIDEM TARTRATE 12.5 MG/1
12.5 TABLET, FILM COATED, EXTENDED RELEASE ORAL
Qty: 30 TABLET | Refills: 0 | Status: SHIPPED | OUTPATIENT
Start: 2021-06-29 | End: 2021-07-20

## 2021-07-23 DIAGNOSIS — G89.4 CHRONIC PAIN DISORDER: ICD-10-CM

## 2021-07-23 DIAGNOSIS — F51.04 PSYCHOPHYSIOLOGICAL INSOMNIA: ICD-10-CM

## 2021-07-27 RX ORDER — OXYCODONE HYDROCHLORIDE 20 MG/1
TABLET ORAL
Qty: 120 TABLET | Refills: 0 | Status: SHIPPED | OUTPATIENT
Start: 2021-07-27 | End: 2022-03-01 | Stop reason: SDUPTHER

## 2021-07-27 RX ORDER — ZOLPIDEM TARTRATE 12.5 MG/1
12.5 TABLET, FILM COATED, EXTENDED RELEASE ORAL
Qty: 30 TABLET | Refills: 0 | Status: SHIPPED | OUTPATIENT
Start: 2021-07-27 | End: 2021-09-23

## 2021-07-27 NOTE — TELEPHONE ENCOUNTER
Opiate: I queried this patient on the [place] [address] Monitoring Program (PDMP) website to verify the patient's prescription refill history  PDMP Review       Value Time User    PDMP Reviewed  Yes 7/27/2021  9:15 AM Bobby Lanier, DO          The patient has appropriately requested a prescription refill this month and refill requests have been appropriate       I will refill the requests

## 2021-09-16 ENCOUNTER — APPOINTMENT (OUTPATIENT)
Dept: RADIOLOGY | Facility: CLINIC | Age: 66
End: 2021-09-16
Payer: COMMERCIAL

## 2021-09-16 ENCOUNTER — OFFICE VISIT (OUTPATIENT)
Dept: OBGYN CLINIC | Facility: CLINIC | Age: 66
End: 2021-09-16
Payer: COMMERCIAL

## 2021-09-16 VITALS
BODY MASS INDEX: 36.82 KG/M2 | DIASTOLIC BLOOD PRESSURE: 90 MMHG | SYSTOLIC BLOOD PRESSURE: 133 MMHG | WEIGHT: 195 LBS | HEIGHT: 61 IN | HEART RATE: 78 BPM

## 2021-09-16 DIAGNOSIS — M25.561 CHRONIC PAIN OF RIGHT KNEE: Primary | ICD-10-CM

## 2021-09-16 DIAGNOSIS — M19.032 LOCALIZED PRIMARY OSTEOARTHRITIS OF CARPOMETACARPAL (CMC) JOINT OF LEFT WRIST: ICD-10-CM

## 2021-09-16 DIAGNOSIS — G89.29 CHRONIC PAIN OF RIGHT KNEE: Primary | ICD-10-CM

## 2021-09-16 DIAGNOSIS — M17.11 PRIMARY OSTEOARTHRITIS OF RIGHT KNEE: ICD-10-CM

## 2021-09-16 DIAGNOSIS — M65.312 TRIGGER THUMB OF LEFT HAND: ICD-10-CM

## 2021-09-16 DIAGNOSIS — M25.561 CHRONIC PAIN OF RIGHT KNEE: ICD-10-CM

## 2021-09-16 DIAGNOSIS — G89.29 CHRONIC PAIN OF RIGHT KNEE: ICD-10-CM

## 2021-09-16 PROCEDURE — 73130 X-RAY EXAM OF HAND: CPT

## 2021-09-16 PROCEDURE — 73560 X-RAY EXAM OF KNEE 1 OR 2: CPT

## 2021-09-16 PROCEDURE — 3008F BODY MASS INDEX DOCD: CPT | Performed by: ORTHOPAEDIC SURGERY

## 2021-09-16 PROCEDURE — 73564 X-RAY EXAM KNEE 4 OR MORE: CPT

## 2021-09-16 PROCEDURE — 1036F TOBACCO NON-USER: CPT | Performed by: ORTHOPAEDIC SURGERY

## 2021-09-16 PROCEDURE — 20600 DRAIN/INJ JOINT/BURSA W/O US: CPT | Performed by: ORTHOPAEDIC SURGERY

## 2021-09-16 PROCEDURE — 99213 OFFICE O/P EST LOW 20 MIN: CPT | Performed by: ORTHOPAEDIC SURGERY

## 2021-09-16 PROCEDURE — 3075F SYST BP GE 130 - 139MM HG: CPT | Performed by: ORTHOPAEDIC SURGERY

## 2021-09-16 PROCEDURE — 3080F DIAST BP >= 90 MM HG: CPT | Performed by: ORTHOPAEDIC SURGERY

## 2021-09-16 PROCEDURE — 20610 DRAIN/INJ JOINT/BURSA W/O US: CPT | Performed by: ORTHOPAEDIC SURGERY

## 2021-09-16 RX ORDER — LIDOCAINE HYDROCHLORIDE 10 MG/ML
1 INJECTION, SOLUTION INFILTRATION; PERINEURAL
Status: COMPLETED | OUTPATIENT
Start: 2021-09-16 | End: 2021-09-16

## 2021-09-16 RX ORDER — BUPIVACAINE HYDROCHLORIDE 2.5 MG/ML
2 INJECTION, SOLUTION INFILTRATION; PERINEURAL
Status: COMPLETED | OUTPATIENT
Start: 2021-09-16 | End: 2021-09-16

## 2021-09-16 RX ORDER — METHYLPREDNISOLONE ACETATE 40 MG/ML
0.5 INJECTION, SUSPENSION INTRA-ARTICULAR; INTRALESIONAL; INTRAMUSCULAR; SOFT TISSUE
Status: COMPLETED | OUTPATIENT
Start: 2021-09-16 | End: 2021-09-16

## 2021-09-16 RX ORDER — METHYLPREDNISOLONE ACETATE 40 MG/ML
2 INJECTION, SUSPENSION INTRA-ARTICULAR; INTRALESIONAL; INTRAMUSCULAR; SOFT TISSUE
Status: COMPLETED | OUTPATIENT
Start: 2021-09-16 | End: 2021-09-16

## 2021-09-16 RX ADMIN — LIDOCAINE HYDROCHLORIDE 1 ML: 10 INJECTION, SOLUTION INFILTRATION; PERINEURAL at 09:13

## 2021-09-16 RX ADMIN — METHYLPREDNISOLONE ACETATE 2 ML: 40 INJECTION, SUSPENSION INTRA-ARTICULAR; INTRALESIONAL; INTRAMUSCULAR; SOFT TISSUE at 09:13

## 2021-09-16 RX ADMIN — BUPIVACAINE HYDROCHLORIDE 2 ML: 2.5 INJECTION, SOLUTION INFILTRATION; PERINEURAL at 09:13

## 2021-09-16 RX ADMIN — METHYLPREDNISOLONE ACETATE 0.5 ML: 40 INJECTION, SUSPENSION INTRA-ARTICULAR; INTRALESIONAL; INTRAMUSCULAR; SOFT TISSUE at 09:13

## 2021-09-16 NOTE — PROGRESS NOTES
Patient Name:  Abhay Herndon  MRN:  [id number]    [address] [place] [address]     1  Chronic pain of right knee  -     XR knee 4+ vw right injury; Future; Expected date: 09/16/2021    2  Localized primary osteoarthritis of carpometacarpal (CMC) joint of left wrist  -     XR hand 3+ vw left; Future; Expected date: 09/16/2021  -     Small joint arthrocentesis: L thumb CMC    3  Primary osteoarthritis of right knee  -     Large joint arthrocentesis: R knee  -     Ambulatory referral to Physical Therapy; Future    Xrays of the right knee and left hand as well as her examinations of both were discussed with patient  Pt was advised that her right knee pain is due to exacerbation of her osteoarthritis from her fall  Pt was offered and accepted right knee CSI  Pt was also provided with order for formal therapy to work on strengthening     Pt was offered repeat left [facility] CSI due to the relief she has had in the past  Pt was also advised that it would be beneficial to see a hand specialist in the future to discuss surgical options and continued care  Risks and benefits of corticosteroid injection were discussed in detail  Risks including:  Post injection pain, elevation in blood sugar, skin discoloration, fatty atrophy, and infection were discussed in detail  The patient understood and elected to proceed forward  After sterile preparation the right knee  was injected with 2 cc of 1% lidocaine, 2 cc of 0 25% bupivacaine, and 2 cc of Depo-Medrol and Left thumb CMC joint was injected with 1cc of 1% lidocaine and 0 5 cc of Depo-Medrol  The patient tolerated the procedures and no immediate complications were noted  The patient was instructed to ice and elevate the injection sites, limit strenuous activity for the next 24-48 hours, and contact us if there were any questions or concerns prior to their follow-up appointment  They were also instructed to contact their primary care physician to discuss elevated blood sugar    I will see the patient back in 6-8 weeks  Chief Complaint     Right knee pain and left thumb pain     History of the Present Illness     Brie Hope is a 61 y o  female with complaints of right knee pain that has been going on for about 6 weeks  Pt states that her pain began after a fall on the stairs at home  Pt states that she has continued pain going up and down stairs  Pt denies instability  Pt also presents to the office with return of pain at the base of her left thumb  Pt has history of Left distal radius ORIF and EPL tendon release and hematoma evacuation performed on  3/12/2021 performed by Dr Ninetta Duverney  Pt has previously received CSI for her left CMC osteoarthritis about every 6 months for 7 years per patient  Pt states that she has difficulty dropping things due to weakness in her  strength  Review of Systems     Review of Systems   Constitutional: Negative for chills, fever and unexpected weight change  HENT: Negative for hearing loss, nosebleeds and sore throat  Eyes: Negative for pain, redness and visual disturbance  Respiratory: Negative for cough, shortness of breath and wheezing  Cardiovascular: Negative for chest pain, palpitations and leg swelling  Gastrointestinal: Negative for abdominal pain, nausea and vomiting  Endocrine: Negative for polydipsia and polyuria  Genitourinary: Negative for dysuria and hematuria  Skin: Negative for rash and wound  Neurological: Negative for dizziness and headaches  Psychiatric/Behavioral: Negative for decreased concentration, dysphoric mood and suicidal ideas  The patient is not nervous/anxious  Physical Exam     /90   Pulse 78   Ht 5' 1" (1 549 m)   Wt 88 5 kg (195 lb)   BMI 36 84 kg/m²     Right  Knee: Range of motion from 0 to 115  There is minimal crepitus with range of motion  There is no effusion  There is tenderness over the medial joint line  There is 5/5 quadriceps strength and normal tone    The patient is able to perform a straight leg raise  negative  patellar grind test  Anterior drawer tests is negative  negative Lachman Test  Posterior drawer test is negative  Varus stress testing reveals no instability  Valgus stress testing reveals no instability  Caroline's testing is negative  The patient is neurovascular intact distally  Left wrist   Well healed incision   No erythema or ecchymosis   Skin intact   Tender to palpation over the 1st CMC joint  CMC grind test positive for crepitus and mild pain    strength 5/5   Intrinsic strength 5/5       Eyes:  Anicteric sclerae  Neck:  Supple  Lungs:  Normal respiratory effort  Cardiovascular:  Capillary refill is less than 2 seconds  Skin:  Intact without erythema  Neurologic:  Sensation grossly intact to light touch  Psychiatric:  Mood and affect are appropriate      Data Review     I have personally reviewed pertinent films in PACS, and my interpretation follows:    xrays of the right knee performed today were reviewed showing medial and lateral bone spurs, degenerative changes consistent with osteoarthritis, medial joint space narrowing    xrays of the left hand performed today show distal radius orthopedic hardware, degenerative changes at the cmc joint of the thumb, no acute fracture or dislocation          Past Medical History:   Diagnosis Date    Anxiety     Depression     DVT (deep venous thrombosis) ([address] )     Hypertension        Past Surgical History:   Procedure Laterality Date     SECTION      CHOLECYSTECTOMY      COSMETIC SURGERY      GASTRIC BYPASS      [place] OPEN RX DISTAL RADIUS FX, EXTRA-ARTICULAR Left 3/12/2021    Procedure: OPEN REDUCTION W/ INTERNAL FIXATION (ORIF) RADIUS Left distal radius, EPL tendon release;  Surgeon: Kimberly Hernández MD;  Location[facility];  Service: Orthopedics       Allergies   Allergen Reactions    Molds & Smuts Hives     Difficulty breathing    Other Hives     Cherry, walnuts, almonds, all nuts, fresh tree fruits, pet dander    Peach Flavor - Food Allergy Hives     Fresh peaches only    Pollen Extract Hives       Current Outpatient Medications on File Prior to Visit   Medication Sig Dispense Refill    ARIPiprazole (ABILIFY) 5 mg tablet       buPROPion (WELLBUTRIN XL) 300 mg 24 hr tablet       cyclobenzaprine (FLEXERIL) 10 mg tablet TAKE 1 TABLET BY MOUTH THREE TIMES DAILY AS NEEDED FOR MUSCLE SPASM(S) 90 tablet 10    famotidine (PEPCID) 20 mg tablet Take 1 tablet (20 mg total) by mouth daily 90 tablet 1    fentaNYL (DURAGESIC) 25 mcg/hr Place 1 patch on the skin every third day      FLUoxetine (PROzac) 40 MG capsule Take 40 mg by mouth      metoprolol succinate (TOPROL-XL) 25 mg 24 hr tablet TAKE ONE TABLET BY MOUTH EVERY DAY 90 tablet 1    MULTIPLE VITAMIN PO Take by mouth      oxyCODONE (ROXICODONE) 20 MG TABS Take one tablet by mouth every 6 hours as needed for pain 120 tablet 0    QUEtiapine (SEROquel) 50 mg tablet Take 50 mg by mouth      Xarelto 20 MG tablet Take 1 tablet (20 mg total) by mouth daily with breakfast 90 tablet 1    zolpidem (AMBIEN CR) 12 5 MG CR tablet Take 1 tablet (12 5 mg total) by mouth daily at bedtime as needed for sleep 30 tablet 0    Calcium Carbonate-Vitamin D 600-400 MG-UNIT per tablet Take 2 tablets by mouth daily 60 tablet 1    gabapentin (NEURONTIN) 600 MG tablet Take 2 tablets (1,200 mg total) by mouth 3 (three) times a day 180 tablet 0     No current facility-administered medications on file prior to visit  Social History     Tobacco Use    Smoking status: Never Smoker    Smokeless tobacco: Never Used   Vaping Use    Vaping Use: Never used   Substance Use Topics    Alcohol use: Not Currently    Drug use: Never       No family history on file  Procedures Performed     Large joint arthrocentesis: R knee  Universal Protocol:  Consent: Verbal consent obtained    Risks and benefits: risks, benefits and alternatives were discussed  Consent given by: patient  Patient understanding: patient states understanding of the procedure being performed  Patient consent: the patient's understanding of the procedure matches consent given  Site marked: the operative site was marked  Radiology Images displayed and confirmed  If images not available, report reviewed: imaging studies available  Required items: required blood products, implants, devices, and special equipment available    Supporting Documentation  Indications: pain and joint swelling   Procedure Details  Location: knee - R knee  Preparation: Patient was prepped and draped in the usual sterile fashion  Ultrasound guidance: no  Medications administered: 2 mL bupivacaine 0 25 %; 1 mL lidocaine 1 %; 2 mL methylPREDNISolone acetate 40 mg/mL    Patient tolerance: patient tolerated the procedure well with no immediate complications  Dressing:  Sterile dressing applied    Small joint arthrocentesis: L thumb CMC  [place] Protocol:  Consent: Verbal consent obtained    Risks and benefits: risks, benefits and alternatives were discussed  Consent given by: patient  Patient understanding: patient states understanding of the procedure being performed  Patient consent: the patient's understanding of the procedure matches consent given  Site marked: the operative site was marked  Supporting Documentation  Indications: pain   Procedure Details  Location: thumb - L thumb CMC  Preparation: Patient was prepped and draped in the usual sterile fashion  Needle size: 27 G  Ultrasound guidance: no  Medications administered: 1 mL lidocaine 1 %; 0 5 mL methylPREDNISolone acetate 40 mg/mL    Patient tolerance: patient tolerated the procedure well with no immediate complications  Dressing:  Sterile dressing applied              Scribe Attestation    I,:  Raymond Grace am acting as a scribe while in the presence of the attending physician :       I,:  Jimmy Koch DO personally performed the services described in this documentation    as scribed in my presence :

## 2021-09-20 DIAGNOSIS — M85.859 OSTEOPENIA OF NECK OF FEMUR, UNSPECIFIED LATERALITY: ICD-10-CM

## 2021-09-20 DIAGNOSIS — K21.9 GASTROESOPHAGEAL REFLUX DISEASE WITHOUT ESOPHAGITIS: ICD-10-CM

## 2021-09-20 DIAGNOSIS — F51.04 PSYCHOPHYSIOLOGICAL INSOMNIA: ICD-10-CM

## 2021-09-20 DIAGNOSIS — M54.16 LUMBAR NERVE ROOT ENTRAPMENT: ICD-10-CM

## 2021-09-20 RX ORDER — FAMOTIDINE 20 MG/1
20 TABLET, FILM COATED ORAL DAILY
Qty: 90 TABLET | Refills: 0 | Status: SHIPPED | OUTPATIENT
Start: 2021-09-20 | End: 2022-02-28 | Stop reason: SDUPTHER

## 2021-09-22 RX ORDER — CYCLOBENZAPRINE HCL 10 MG
10 TABLET ORAL 3 TIMES DAILY PRN
Qty: 90 TABLET | Refills: 0 | Status: SHIPPED | OUTPATIENT
Start: 2021-09-22 | End: 2022-02-28 | Stop reason: SDUPTHER

## 2021-09-23 RX ORDER — ZOLPIDEM TARTRATE 12.5 MG/1
TABLET, FILM COATED, EXTENDED RELEASE ORAL
Qty: 30 TABLET | Refills: 0 | Status: SHIPPED | OUTPATIENT
Start: 2021-09-23 | End: 2021-10-15

## 2021-10-15 DIAGNOSIS — F51.04 PSYCHOPHYSIOLOGICAL INSOMNIA: ICD-10-CM

## 2021-10-15 RX ORDER — ZOLPIDEM TARTRATE 12.5 MG/1
TABLET, FILM COATED, EXTENDED RELEASE ORAL
Qty: 30 TABLET | Refills: 0 | Status: SHIPPED | OUTPATIENT
Start: 2021-10-15

## 2021-10-25 ENCOUNTER — APPOINTMENT (OUTPATIENT)
Dept: RADIOLOGY | Facility: CLINIC | Age: 66
End: 2021-10-25
Payer: COMMERCIAL

## 2021-10-25 ENCOUNTER — OFFICE VISIT (OUTPATIENT)
Dept: OBGYN CLINIC | Facility: CLINIC | Age: 66
End: 2021-10-25
Payer: COMMERCIAL

## 2021-10-25 VITALS
DIASTOLIC BLOOD PRESSURE: 80 MMHG | BODY MASS INDEX: 37.69 KG/M2 | RESPIRATION RATE: 18 BRPM | HEART RATE: 74 BPM | SYSTOLIC BLOOD PRESSURE: 117 MMHG | HEIGHT: 61 IN | WEIGHT: 199.6 LBS

## 2021-10-25 DIAGNOSIS — M79.672 PAIN IN LEFT FOOT: ICD-10-CM

## 2021-10-25 DIAGNOSIS — M18.12 PRIMARY OSTEOARTHRITIS OF FIRST CARPOMETACARPAL JOINT OF LEFT HAND: ICD-10-CM

## 2021-10-25 DIAGNOSIS — M19.072 OSTEOARTHRITIS OF MIDTARSAL JOINT OF LEFT FOOT: Primary | ICD-10-CM

## 2021-10-25 DIAGNOSIS — M17.11 PRIMARY OSTEOARTHRITIS OF RIGHT KNEE: ICD-10-CM

## 2021-10-25 PROCEDURE — 3008F BODY MASS INDEX DOCD: CPT | Performed by: ORTHOPAEDIC SURGERY

## 2021-10-25 PROCEDURE — 1036F TOBACCO NON-USER: CPT | Performed by: ORTHOPAEDIC SURGERY

## 2021-10-25 PROCEDURE — 73630 X-RAY EXAM OF FOOT: CPT

## 2021-10-25 PROCEDURE — 99213 OFFICE O/P EST LOW 20 MIN: CPT | Performed by: ORTHOPAEDIC SURGERY

## 2021-10-25 PROCEDURE — 3079F DIAST BP 80-89 MM HG: CPT | Performed by: ORTHOPAEDIC SURGERY

## 2021-10-25 PROCEDURE — 3074F SYST BP LT 130 MM HG: CPT | Performed by: ORTHOPAEDIC SURGERY

## 2021-10-25 RX ORDER — METHOCARBAMOL 750 MG/1
TABLET, FILM COATED ORAL
COMMUNITY
Start: 2021-10-15

## 2021-11-17 ENCOUNTER — OFFICE VISIT (OUTPATIENT)
Dept: INTERNAL MEDICINE CLINIC | Facility: CLINIC | Age: 66
End: 2021-11-17
Payer: COMMERCIAL

## 2021-11-17 VITALS
HEART RATE: 61 BPM | DIASTOLIC BLOOD PRESSURE: 86 MMHG | HEIGHT: 61 IN | BODY MASS INDEX: 36.82 KG/M2 | SYSTOLIC BLOOD PRESSURE: 111 MMHG | OXYGEN SATURATION: 98 % | WEIGHT: 195 LBS | TEMPERATURE: 97.9 F

## 2021-11-17 DIAGNOSIS — I10 ESSENTIAL HYPERTENSION: Primary | ICD-10-CM

## 2021-11-17 DIAGNOSIS — Z79.01 CHRONIC ANTICOAGULATION: ICD-10-CM

## 2021-11-17 DIAGNOSIS — F51.04 PSYCHOPHYSIOLOGICAL INSOMNIA: ICD-10-CM

## 2021-11-17 DIAGNOSIS — D68.51 FACTOR 5 LEIDEN MUTATION, HETEROZYGOUS (HCC): ICD-10-CM

## 2021-11-17 DIAGNOSIS — F33.41 RECURRENT MAJOR DEPRESSIVE DISORDER, IN PARTIAL REMISSION (HCC): ICD-10-CM

## 2021-11-17 DIAGNOSIS — Z23 ENCOUNTER FOR IMMUNIZATION: ICD-10-CM

## 2021-11-17 DIAGNOSIS — E55.9 VITAMIN D INSUFFICIENCY: ICD-10-CM

## 2021-11-17 DIAGNOSIS — F11.20 CONTINUOUS OPIOID DEPENDENCE (HCC): ICD-10-CM

## 2021-11-17 DIAGNOSIS — Z00.00 ENCOUNTER FOR INITIAL ANNUAL WELLNESS VISIT (AWV) IN MEDICARE PATIENT: ICD-10-CM

## 2021-11-17 DIAGNOSIS — G89.4 CHRONIC PAIN DISORDER: ICD-10-CM

## 2021-11-17 DIAGNOSIS — E66.01 OBESITY, MORBID (HCC): ICD-10-CM

## 2021-11-17 PROCEDURE — 3074F SYST BP LT 130 MM HG: CPT | Performed by: FAMILY MEDICINE

## 2021-11-17 PROCEDURE — G0008 ADMIN INFLUENZA VIRUS VAC: HCPCS | Performed by: FAMILY MEDICINE

## 2021-11-17 PROCEDURE — G0438 PPPS, INITIAL VISIT: HCPCS | Performed by: FAMILY MEDICINE

## 2021-11-17 PROCEDURE — 3008F BODY MASS INDEX DOCD: CPT | Performed by: FAMILY MEDICINE

## 2021-11-17 PROCEDURE — 90682 RIV4 VACC RECOMBINANT DNA IM: CPT | Performed by: FAMILY MEDICINE

## 2021-11-17 PROCEDURE — 3079F DIAST BP 80-89 MM HG: CPT | Performed by: FAMILY MEDICINE

## 2021-11-17 PROCEDURE — 1036F TOBACCO NON-USER: CPT | Performed by: FAMILY MEDICINE

## 2021-11-17 PROCEDURE — 99214 OFFICE O/P EST MOD 30 MIN: CPT | Performed by: FAMILY MEDICINE

## 2021-12-09 DIAGNOSIS — I10 ESSENTIAL HYPERTENSION: ICD-10-CM

## 2021-12-09 RX ORDER — METOPROLOL SUCCINATE 25 MG/1
TABLET, EXTENDED RELEASE ORAL
Qty: 90 TABLET | Refills: 1 | Status: SHIPPED | OUTPATIENT
Start: 2021-12-09 | End: 2022-02-28 | Stop reason: SDUPTHER

## 2022-01-21 ENCOUNTER — OFFICE VISIT (OUTPATIENT)
Dept: INTERNAL MEDICINE CLINIC | Facility: CLINIC | Age: 67
End: 2022-01-21
Payer: COMMERCIAL

## 2022-01-21 VITALS
DIASTOLIC BLOOD PRESSURE: 98 MMHG | HEIGHT: 61 IN | TEMPERATURE: 98.9 F | WEIGHT: 199 LBS | SYSTOLIC BLOOD PRESSURE: 140 MMHG | BODY MASS INDEX: 37.57 KG/M2

## 2022-01-21 DIAGNOSIS — E66.01 OBESITY, MORBID (HCC): ICD-10-CM

## 2022-01-21 DIAGNOSIS — D68.51 FACTOR 5 LEIDEN MUTATION, HETEROZYGOUS (HCC): ICD-10-CM

## 2022-01-21 DIAGNOSIS — F11.20 CONTINUOUS OPIOID DEPENDENCE (HCC): ICD-10-CM

## 2022-01-21 DIAGNOSIS — I26.99 PE (PULMONARY THROMBOEMBOLISM) (HCC): ICD-10-CM

## 2022-01-21 DIAGNOSIS — F33.41 RECURRENT MAJOR DEPRESSIVE DISORDER, IN PARTIAL REMISSION (HCC): ICD-10-CM
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
## 2022-01-21 DIAGNOSIS — M79.602 PAIN OF LEFT UPPER EXTREMITY: Primary | ICD-10-CM

## 2022-01-21 PROCEDURE — 3080F DIAST BP >= 90 MM HG: CPT | Performed by: FAMILY MEDICINE

## 2022-01-21 PROCEDURE — 99214 OFFICE O/P EST MOD 30 MIN: CPT | Performed by: FAMILY MEDICINE

## 2022-01-21 PROCEDURE — 3008F BODY MASS INDEX DOCD: CPT | Performed by: FAMILY MEDICINE

## 2022-01-21 PROCEDURE — 1036F TOBACCO NON-USER: CPT | Performed by: FAMILY MEDICINE

## 2022-01-21 PROCEDURE — 3077F SYST BP >= 140 MM HG: CPT | Performed by: FAMILY MEDICINE

## 2022-01-21 NOTE — PROGRESS NOTES
FOLLOW-UP OFFICE VISIT  [facility] LISANDRA SCHULTZ    NAME: Shahbaz Casanova  AGE: 61 y o  SEX: female  : 1955     DATE: 2022     Assessment and Plan:     Problem List Items Addressed This Visit        Hematopoietic and Hemostatic    Factor 5 Leiden mutation, heterozygous ([facility] [address] )       Other    Continuous opioid dependence ([facility] [address] )    Obesity, morbid ([facility] [address] )    Recurrent major depressive disorder, in partial remission ([facility] [address] )      Other Visit Diagnoses     Pain of left upper extremity    -  Primary    Relevant Orders    XR humerus left    XR shoulder 2+ vw left    Fall due to slipping on ice or snow, initial encounter        PE (pulmonary thromboembolism) ([facility] [address] )            On anticoagulation therapy for issues noted above  bruising was significant but is resolving  Has opiate therapy at home  Will get imagine to r/o bony trauma due to bony tenderness noted on exam    BMI Counseling: Body mass index is 37 6 kg/m²  The BMI is above normal  Nutrition recommendations include encouraging healthy choices of fruits and vegetables, limiting drinks that contain sugar, increasing intake of lean protein and reducing intake of saturated and trans fat  Rationale for BMI follow-up plan is due to patient being overweight or obese  Return in about 6 months (around 2022) for Next scheduled follow up  Chief Complaint:     Chief Complaint   Patient presents with    fell on left side- injured left side     fell last week        History of Present Illness:     Pt reports fall  Last week  Slipped on ice  Landed on left side  Most of the force was through the left arm  Has a bruising  Bruising has been fading  Review of Systems:     Review of Systems   Cardiovascular: Negative for chest pain  Musculoskeletal: Positive for arthralgias  Negative for gait problem and joint swelling  Neurological: Negative for numbness  Hematological: Bruises/bleeds easily  Problem List:     Patient Active Problem List   Diagnosis    Vitamin D insufficiency    Lumbar nerve root entrapment    Chronic anticoagulation    Essential hypertension    Gastroesophageal reflux disease without esophagitis    S/P gastric bypass    Closed fracture of left distal radius and ulna    SOB (shortness of breath)    Chronic pain disorder    Continuous opioid dependence (HCC)    Obesity, morbid ([address] )    Recurrent major depressive disorder, in partial remission ([facility] [address] )        Objective:     /98 (BP Location: Left arm, Patient Position: Sitting)   Temp 98 9 °F (37 2 °C) (Tympanic)   Ht 5' 1" (1 549 m)   Wt 90 3 kg (199 lb)   BMI 37 60 kg/m²     Physical Exam  HENT:      Head: Normocephalic and atraumatic  Eyes:      Conjunctiva/sclera: Conjunctivae normal    Cardiovascular:      Rate and Rhythm: Normal rate  Pulmonary:      Effort: Pulmonary effort is normal    Musculoskeletal:         General: Tenderness present  Normal range of motion  Left upper arm: Tenderness and bony tenderness present  Skin:     Findings: Bruising present  Neurological:      Mental Status: She is alert           Pertinent Laboratory/Diagnostic Studies:    Laboratory Results: I have personally reviewed the pertinent laboratory results/reports       [facility]: [facility]  1/21/2022 4:21 PM

## 2022-01-22 ENCOUNTER — HOSPITAL ENCOUNTER (OUTPATIENT)
Dept: RADIOLOGY | Facility: HOSPITAL | Age: 67
Discharge: HOME | End: 2022-01-22
Payer: COMMERCIAL

## 2022-01-22 DIAGNOSIS — M79.602 PAIN OF LEFT UPPER EXTREMITY: ICD-10-CM

## 2022-01-22 PROCEDURE — 73060 X-RAY EXAM OF HUMERUS: CPT

## 2022-01-22 PROCEDURE — 73030 X-RAY EXAM OF SHOULDER: CPT

## 2022-01-26 ENCOUNTER — TELEPHONE (OUTPATIENT)
Dept: INTERNAL MEDICINE CLINIC | Facility: CLINIC | Age: 67
End: 2022-01-26

## 2022-02-28 ENCOUNTER — NURSE TRIAGE (OUTPATIENT)
Dept: OTHER | Facility: OTHER | Age: 67
End: 2022-02-28

## 2022-02-28 DIAGNOSIS — M54.16 LUMBAR NERVE ROOT ENTRAPMENT: ICD-10-CM

## 2022-02-28 DIAGNOSIS — I10 ESSENTIAL HYPERTENSION: ICD-10-CM

## 2022-02-28 DIAGNOSIS — K21.9 GASTROESOPHAGEAL REFLUX DISEASE WITHOUT ESOPHAGITIS: ICD-10-CM

## 2022-02-28 DIAGNOSIS — S52.602A CLOSED FRACTURE OF DISTAL ENDS OF LEFT RADIUS AND ULNA, INITIAL ENCOUNTER: Primary | ICD-10-CM

## 2022-02-28 DIAGNOSIS — I26.99 PE (PULMONARY THROMBOEMBOLISM) (HCC): ICD-10-CM

## 2022-02-28 DIAGNOSIS — M85.859 OSTEOPENIA OF NECK OF FEMUR, UNSPECIFIED LATERALITY: ICD-10-CM

## 2022-02-28 DIAGNOSIS — S52.502A CLOSED FRACTURE OF DISTAL ENDS OF LEFT RADIUS AND ULNA, INITIAL ENCOUNTER: Primary | ICD-10-CM

## 2022-02-28 DIAGNOSIS — F51.04 PSYCHOPHYSIOLOGICAL INSOMNIA: ICD-10-CM

## 2022-02-28 RX ORDER — RIVAROXABAN 20 MG/1
TABLET, FILM COATED ORAL
Qty: 30 TABLET | Refills: 3 | Status: SHIPPED | OUTPATIENT
Start: 2022-02-28 | End: 2022-03-01

## 2022-02-28 NOTE — TELEPHONE ENCOUNTER
Regarding: Med Refill: Xarelto all out  ----- Message from Agustin Waldron RN sent at 2/28/2022 11:20 AM EST -----  Called for med refills, but out of Xarelto

## 2022-02-28 NOTE — TELEPHONE ENCOUNTER
Reason for Disposition   Prescription refill request for ESSENTIAL medicine (i e , likelihood of harm to patient if not taken) and triager unable to refill per department policy    Answer Assessment - Initial Assessment Questions  1  NAME of MEDICATION: "What medicine are you calling about?"      Xarelto   2  QUESTION: "What is your question?" (e g , medication refill, side effect)      Patient is completely out of Xarelto, requested to refill as soon as possible  3  PRESCRIBING HCP: "Who prescribed it?" Reason: if prescribed by specialist, call should be referred to that group        Dr Radha Brown    Protocols used: MEDICATION QUESTION CALL-ADULT-[place]

## 2022-03-01 DIAGNOSIS — G89.4 CHRONIC PAIN DISORDER: ICD-10-CM

## 2022-03-01 DIAGNOSIS — M54.16 LUMBAR NERVE ROOT ENTRAPMENT: ICD-10-CM

## 2022-03-01 DIAGNOSIS — M85.859 OSTEOPENIA OF NECK OF FEMUR, UNSPECIFIED LATERALITY: ICD-10-CM

## 2022-03-01 RX ORDER — ARIPIPRAZOLE 5 MG/1
TABLET ORAL
Refills: 0 | OUTPATIENT
Start: 2022-03-01

## 2022-03-01 RX ORDER — METOPROLOL SUCCINATE 25 MG/1
25 TABLET, EXTENDED RELEASE ORAL DAILY
Qty: 90 TABLET | Refills: 0 | Status: SHIPPED | OUTPATIENT
Start: 2022-03-01 | End: 2022-05-30

## 2022-03-01 RX ORDER — FLUOXETINE HYDROCHLORIDE 40 MG/1
40 CAPSULE ORAL
Refills: 0 | OUTPATIENT
Start: 2022-03-01

## 2022-03-01 RX ORDER — CYCLOBENZAPRINE HCL 10 MG
10 TABLET ORAL 3 TIMES DAILY PRN
Qty: 90 TABLET | Refills: 0 | Status: SHIPPED | OUTPATIENT
Start: 2022-03-01

## 2022-03-01 RX ORDER — GABAPENTIN 600 MG/1
1200 TABLET ORAL 3 TIMES DAILY
Qty: 180 TABLET | Refills: 0 | Status: SHIPPED | OUTPATIENT
Start: 2022-03-01 | End: 2022-03-31

## 2022-03-01 RX ORDER — ZOLPIDEM TARTRATE 12.5 MG/1
12.5 TABLET, FILM COATED, EXTENDED RELEASE ORAL
Qty: 30 TABLET | Refills: 0 | OUTPATIENT
Start: 2022-03-01

## 2022-03-01 RX ORDER — FAMOTIDINE 20 MG/1
20 TABLET, FILM COATED ORAL DAILY
Qty: 90 TABLET | Refills: 0 | Status: SHIPPED | OUTPATIENT
Start: 2022-03-01

## 2022-03-01 RX ORDER — BUPROPION HYDROCHLORIDE 300 MG/1
300 TABLET ORAL
Refills: 0 | OUTPATIENT
Start: 2022-03-01

## 2022-03-01 RX ORDER — METHOCARBAMOL 750 MG/1
TABLET, FILM COATED ORAL
Refills: 0 | OUTPATIENT
Start: 2022-03-01

## 2022-03-02 DIAGNOSIS — G89.4 CHRONIC PAIN DISORDER: ICD-10-CM

## 2022-03-02 RX ORDER — OXYCODONE HYDROCHLORIDE 20 MG/1
TABLET ORAL
Qty: 120 TABLET | Refills: 0 | Status: SHIPPED | OUTPATIENT
Start: 2022-03-02 | End: 2022-03-02 | Stop reason: SDUPTHER

## 2022-03-02 RX ORDER — GABAPENTIN 600 MG/1
1200 TABLET ORAL 3 TIMES DAILY
Qty: 180 TABLET | Refills: 0 | OUTPATIENT
Start: 2022-03-02 | End: 2022-04-01

## 2022-03-02 RX ORDER — CYCLOBENZAPRINE HCL 10 MG
10 TABLET ORAL 3 TIMES DAILY PRN
Qty: 90 TABLET | Refills: 0 | OUTPATIENT
Start: 2022-03-02

## 2022-03-02 NOTE — TELEPHONE ENCOUNTER
Medication refill went to the wrong pharmacy    Please send prescription to [facility] in [place], [place]

## 2022-03-03 RX ORDER — OXYCODONE HYDROCHLORIDE 20 MG/1
TABLET ORAL
Qty: 120 TABLET | Refills: 0 | Status: SHIPPED | OUTPATIENT
Start: 2022-03-03

## 2022-04-13 ENCOUNTER — TELEPHONE (OUTPATIENT)
Dept: INTERNAL MEDICINE CLINIC | Facility: CLINIC | Age: 67
End: 2022-04-13

## 2022-04-13 NOTE — TELEPHONE ENCOUNTER
Received fax request for medical records to be sent to [address] [place] [address]   Faxed on 4/13/2022 to [facility] phone [phone]

## 2022-05-29 DIAGNOSIS — I10 ESSENTIAL HYPERTENSION: ICD-10-CM

## 2022-05-30 RX ORDER — METOPROLOL SUCCINATE 25 MG/1
TABLET, EXTENDED RELEASE ORAL
Qty: 90 TABLET | Refills: 0 | Status: SHIPPED | OUTPATIENT
Start: 2022-05-30

## 2022-06-06 NOTE — PROGRESS NOTES
Daily Note     Today's date: 2021  Patient name: Clary Mercado  : 1955  MRN: [id number]  Referring provider: Adria Rodriguez  Dx: Left DRF ORIF               Subjective:  "My thumb is feeling a little better now "  "I lifted a gallon of milk"      Objective: See treatment diary below  HEP and splint wear reviewed to reduce hand edema  Scar and edema care and HEP reviewed today  Hold clinic exercises  AROM  FA S/P  80/90  Wrist E/F L   58/55  R  70/65  Digits WNL      Assessment: Tolerated treatment well  Patient would benefit from continued OT  Painful CMC joint reducing  Wrist pain is minimal   Steady increased of AROM of the FA and wrist   Trigger of thumb persists  Plan: Progress treament per protocol  Continue splint wear  Instructed pt in purchasing thumb comfort cool splint for pain of CMC joint  Next week, resume care and progress as pain allows  Reduce frequency to once weekly until NDV  Last appt  NV  Precautions:  Universal       Manuals 3/17 3/23 3/25 4/1 4/7 4/15 4/22 4/28     Edema care  10'           Scar care/  IASTM  NV 5' 5' 5' 5' 10' 10'     Kinesiotaping  5' 5' 5' 5'       Joint mobs     5'  thumb        Neuro Re-Ed                                                                                                        Ther Ex 15'   15'  10'   10'   15'   25  15'   25'     HEP Ortho fit/train  AROM ex HEP reviewedema splint fit HEP post injection; pain; ROM  Review to avoid trigger  5' CMC pain  Splint  Tape, protect  5' Add  Wrist E/F  HEP   15' HEP -  trigger thumb care     5' New HEP for FA/ wrist PRE  15''     AAROM FA, wrist, hand    10' 10' 10' 10' 10'                                                                                   Ther Activity                                       Gait Training                                       Modalities                                RE no
